# Patient Record
Sex: MALE | Race: WHITE | Employment: FULL TIME | URBAN - METROPOLITAN AREA
[De-identification: names, ages, dates, MRNs, and addresses within clinical notes are randomized per-mention and may not be internally consistent; named-entity substitution may affect disease eponyms.]

---

## 2017-12-11 PROBLEM — E66.9 OBESITY (BMI 30-39.9): Status: ACTIVE | Noted: 2017-12-11

## 2017-12-11 PROBLEM — E78.2 MIXED HYPERLIPIDEMIA: Status: ACTIVE | Noted: 2017-12-11

## 2018-08-22 ENCOUNTER — HOSPITAL ENCOUNTER (OUTPATIENT)
Dept: PHYSICAL THERAPY | Age: 38
Discharge: HOME | End: 2018-08-22
Attending: FAMILY MEDICINE
Payer: COMMERCIAL

## 2018-08-22 DIAGNOSIS — M62.830 MUSCLE SPASM OF BACK: ICD-10-CM

## 2018-08-22 DIAGNOSIS — R20.0 NUMBNESS AND TINGLING OF BOTH LOWER EXTREMITIES: ICD-10-CM

## 2018-08-22 DIAGNOSIS — M54.50 LOW BACK PAIN AT MULTIPLE SITES: ICD-10-CM

## 2018-08-22 DIAGNOSIS — R20.2 NUMBNESS AND TINGLING OF BOTH LOWER EXTREMITIES: ICD-10-CM

## 2018-08-22 PROCEDURE — 97162 PT EVAL MOD COMPLEX 30 MIN: CPT

## 2018-08-22 PROCEDURE — 97110 THERAPEUTIC EXERCISES: CPT

## 2018-08-22 NOTE — THERAPY EVALUATION
Mimi Patrick  : 1980      Payor: Dinora Tran / Plan: [place] [facility] / Product Type: PPO /    [id number] [facility], [address], [facility], [address] [place] [address]  [phone]   [phone]              OUTPATIENT PHYSICAL THERAPY:Initial Assessment 2018    ICD-10: Treatment Diagnosis: Low back pain (M54.5)  Muscle spasm of back (M62.830)  Numbness and tingling of both lower extremities (R20.0, R20.2)                       Precautions/Allergies:   Review of patient's allergies indicates no known allergies. Fall Risk Score: 1 (? 5 = High Risk)  MD Orders: Eval and Treat  MEDICAL/REFERRING DIAGNOSIS:  Low back pain at multiple sites [M54.5]  Muscle spasm of back [M62.830]  Numbness and tingling of both lower extremities [R20.0, R20.2]   DATE OF ONSET: 6/15/18  REFERRING PHYSICIAN: Jewels Alvarezr, MD  RETURN PHYSICIAN APPOINTMENT: TBD by patient      INITIAL ASSESSMENT:   Mr. Melissa Wilson presents to physical therapy with decreased strength, ROM, joint mobility, functional mobility, and chronic pain complaints. These S/S are consistent with Chronic low back pain. Patient will benefit from skilled physical therapy for pain science education, manual therapeutic techniques (as appropriate), therapeutic exercises and activities, neuromuscular re-education, and comprehensive home exercises program to address current impairments and functional limitations. Mimi Patrick will benefit from skilled PT (medically necessary) in order to address above deficits affecting participation in basic ADLs and overall functional tolerance. PROBLEM LIST (Impacting functional limitations):  1. Decreased Strength  2. Decreased ADL/Functional Activities  3. Increased Pain  4. Decreased Activity Tolerance  5. Decreased Flexibility/Joint Mobility  6. Decreased [place] with Home Exercise Program INTERVENTIONS PLANNED:  1. Pain Science Education  2.  Home Exercise Program (HEP)  3. Manual Therapy  4. Neuromuscular Re-education/Strengthening  5. Range of Motion (ROM)  6. Therapeutic Activites  7. Therapeutic Exercise/Strengthening     TREATMENT PLAN:  Effective Dates: 8/22/18 TO 9/22/2018 (30 days). Frequency/Duration:1 x per week for 30 Days  GOALS: (Goals have been discussed and agreed upon with patient.)  SHORT-TERM FUNCTIONAL GOALS: Time Frame: 3 weeks  1. Durenda Blaine will report <=5/10 pain with prolonged postures. 2. Durenda Coats will demonstrate improvement in active lumbar flexion to WNL to show increased  participation in ADLs. 3. Durenda Coats will demonstrate demonstrate improvement in the ability to lift 20 lbs from the floor without pain complaints. 4.  Durenda Coats will show a greater than 8 point decrease on the Modified Oswestry in order to show an increase in lumbar function. 5. Durenda Blaine will be independent in all HEP and will have good understanding of pain management principles. DISCHARGE GOALS: Time Frame: 6 weeks    1. Durenda Coats will show full ROM of the lumbar spine  in order to return to full functional mobility   2. Durenda Coats will show a greater than 15 point decrease on the Oswestry in order to show an increase in spinal  function  3. Durenda Coats will report  Being able to return to independent exercise program at a gym without fear of injury. 4. Durenda Coats will be independent in all advanced HEP     Rehabilitation Potential For Stated Goals: Good  Regarding July Brine therapy, I certify that the treatment plan above will be carried out by a therapist or under their direction. Thank you for this referral,  RT Keegan     Referring Physician Signature: Carmen Rivera MD              Date                    HISTORY:   History of Present Injury/Illness (Reason for Referral):  Patient reports that he first experienced back pain approximately 14 years ago while bowling.  He had severe pain for 2-3 weeks. As a result of the injury he gave up sports because he was afraid of another injury. Since that time he has had intermittent bouts of pain without any significant cause. He states that he has been seeing a chiropractor aproximately once a month for 20 years. He also has been seen by pain management for injections and physical therapy with only temporary relief of his symptoms. Most recent exacerbation started 2.5 months ago, no new injury, just a gradual increase in low back pain and bilateral leg numbness. Patient states that he is afraid to do any strenuous activity, he even is afraid to walk his dogs because they could pull suddenly and re-injury his back. Has recently tried steroids and muscle relaxants with no change in his symptoms.    -Present symptoms/complaints (on day of evaluation) Pain is constant in nature  Pain Scale:  · Current: 7/10  · Best: 3/10  · Worst: 8/10    · Aggravating factors: any prolonged posture, lifting , pushing, pulling    · Relieving factors: nothing    Dominant Side  right  Past Medical History/Comorbidities:   Mr. Lalito Hurd  has a past medical history of Anxiety and H/O renal calculi (2002). Mr. Lalito Hurd  has no past surgical history on file. 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 Prior Level of Function/Work/Activity:  Walk 45 min with dogs in woods. Current Medications:    Current Outpatient Prescriptions:     tamsulosin (FLOMAX) 0.4 mg capsule, , Disp: , Rfl:     diclofenac EC (VOLTAREN) 75 mg EC tablet, Take 1 Tab by mouth two (2) times a day., Disp: 60 Tab, Rfl: 0    baclofen (LIORESAL) 10 mg tablet, Take 1 Tab by mouth three (3) times daily. , Disp: 45 Tab, Rfl: 0    pravastatin (PRAVACHOL) 10 mg tablet, Take 1 Tab by mouth nightly., Disp: 30 Tab, Rfl: 2     Date Last Reviewed:  8/23/2018   Number of Personal Factors/Comorbidities that affect the Plan of Care: 1-2: MODERATE COMPLEXITY   EXAMINATION:   Observation/Orthostatic Postural Assessment:      Overall postural presentation WNL. Palpation:          Increased tone lumbar paraverterbrals  ROM:    AROM/PROM          Lumbar Spine Eval Date: 8/22/18  Re-Assess Date:  Re-Assess Date:    ACTIVE ROM (standing) RIGHT LEFT RIGHT LEFT RIGHT LEFT   flexion 20% limitation        Extension 20% limitation end range pain        sidebending WNL  WNL                Hip flexion WNL WNL       IR WNL WNL       ER WNL WNL                                                               Strength:    Joint: Eval Date: 8/22/18  Re-Assess Date:  Re-Assess Date:     RIGHT LEFT RIGHT LEFT RIGHT LEFT   HIP flex 4/5 4/5       abduction 4/5 4/5       extension 4/5 4/5       Knee extension  5/5 5/5       Knee  Flexion  5/5 5/5                Ankle dorsiflexion 5/5 5/5                 plantarflexion 5/5 5/5                  Joint Mobility Eval Date:  Re-Assess Date:  Re-Assess Date:     RIGHT LEFT RIGHT LEFT RIGHT LEFT   lumbar Mild hypomobility extension with  PAs but non painful            Special Tests:    Slump test: negative, SLR negative for reproduction of sciatica. Positive for hamstring tightness bilateral 45 degrees. Neurological Screen: Assessed @ Initial Visit    Radiating symptoms? Yes , patient complains of whole leg parathesias bilateral  Functional Mobility:  Assessed @ Initial Visit   Balance:  Assessed @ Initial Visit   Single leg stance bilateral 30 sec +  Functional Mobility: Patient able to perform full squat, shoulder mobility WNL, lumbar flexion 8 inches from floor. Body Structures Involved:  1.   2. Joints  3. Muscles  4. Body Functions Affected:  1. Sensory/Pain  2. Neuromusculoskeletal  3. Movement Related Activities and Participation Affected:  1. Mobility  2.  Self Care   Number of elements that affect the Plan of Care: 3: MODERATE COMPLEXITY   CLINICAL PRESENTATION:   Presentation: Evolving clinical presentation with changing clinical characteristics: MODERATE COMPLEXITY   CLINICAL DECISION MAKING:   Outcome Measure: Tool Used: Disabilities of the Arm, Shoulder and Hand (DASH) Questionnaire - Quick Version  Score:  Initial: 26/55  Most Recent: X/55 (Date: -- )   Interpretation of Score: The DASH is designed to measure the activities of daily living in person's with upper extremity dysfunction or pain. Each section is scored on a 1-5 scale, 5 representing the greatest disability. The scores of each section are added together for a total score of 55. Score 11 12-19 20-28 29-37 38-45 46-54 55   Modifier CH CI CJ CK CL CM CN     ? Carrying, Moving, and Handling Objects:     - CURRENT STATUS: CJ - 20%-39% impaired, limited or restricted    - GOAL STATUS: CJ - 20%-39% impaired, limited or restricted    - D/C STATUS:  CI - 1%-19% impaired, limited or restricted    Medical Necessity:   · Skilled intervention continues to be required due to deficits and impairments seen upon initial evaluation affecting patient's participation in ADLs and functional tasks. ·   Reason for Services/Other Comments:  · Patient continues to require skilled intervention due to deficits and impairments seen upon initial evaluation affecting patient's participation in ADLs and functional tasks. Use of outcome tool(s) and clinical judgement create a POC that gives a: Questionable prediction of patient's progress: MODERATE COMPLEXITY   TREATMENT:   (In addition to Assessment/Re-Assessment sessions the following treatments were rendered)    · Pre-Treatment Pain/ Symptoms: See above report in Initial Assessment 7  7/10       THERAPEUTIC EXERCISE: (15 minutes):  Exercises per grid below to improve mobility, strength and balance. Required minimal visual and verbal cues to promote proper body alignment, promote proper body posture and promote proper body mechanics.   Progressed resistance, range and repetitions as indicated. Date:  8/22/18 Date:   Date:     Activity/Exercise Parameters Parameters Parameters   Patient education Pain science education, POC ex rational                                             Manual Therapy Interventions: (0 minutes): . Treatment/Session Assessment: Patient verbalized and demonstrated understanding of HEP. · Post-Treatment Pain/ Symptoms: 6  Pain= 6/10 ·   Compliance with Program/Exercises: Will assess future therapy sessions. · Recommendations/Intent for next treatment session: \"Next visit will focus on advancements to more challenging activities\".     Future Appointments  Date Time Provider Oneyda Peter   8/29/2018 7:30 AM Eulalio Gallardo MD [facility]   8/29/2018 3:30 PM Sam Rainey, RT [facility]   9/5/2018 3:30 PM Minh Rainey, RT [facility]   9/12/2018 3:30 PM Minh Rainey, RT [facility]   9/19/2018 3:30 PM Minh Rainey, RT [facility]   9/26/2018 3:30 PM Minh Rainey, RT [facility]       Total Treatment Duration:   In 14:10, Out 16;00 total treatment 50 min, 15 min therapeutic exercise    Bibiana Rainey, RT            remember to save as eval

## 2018-08-22 NOTE — PROGRESS NOTES
Ambulatory/Rehab Services H2 Model Falls Risk Assessment    Risk Factor Pts. ·   Confusion/Disorientation/Impulsivity  []    4 ·   Symptomatic Depression  []   2 ·   Altered Elimination  []   1 ·   Dizziness/Vertigo  []   1 ·   Gender (Male)  [x]   1 ·   Any administered antiepileptics (anticonvulsants):  []   2 ·   Any administered benzodiazepines:  []   1 ·   Visual Impairment (specify):  []   1 ·   Portable Oxygen Use  []   1 ·   Orthostatic ? BP  []   1 ·   History of Recent Falls (within 3 mos.)  []   5     Ability to Rise from Chair (choose one) Pts. ·   Ability to rise in a single movement  [x]   0 ·   Pushes up, successful in one attempt  []   1 ·   Multiple attempts, but successful  []   3 ·   Unable to rise without assistance  []   4   Total: (5 or greater = High Risk) 1       Falls Prevention Plan:   []                Physical Limitations to Exercise (specify):   []                Mobility Assistance Device (type):   []                Exercise/Equipment Adaptation (specify):    ©2010 [facility] [address] Patent #[id number].  Federal Law prohibits the replication, distribution or use without written permission from [facility] [employer]

## 2018-08-29 ENCOUNTER — HOSPITAL ENCOUNTER (OUTPATIENT)
Dept: PHYSICAL THERAPY | Age: 38
Discharge: HOME | End: 2018-08-29
Attending: FAMILY MEDICINE
Payer: COMMERCIAL

## 2018-08-29 PROCEDURE — 97110 THERAPEUTIC EXERCISES: CPT

## 2018-08-29 NOTE — PROGRESS NOTES
Chinedu Lei : 1980 Payor: Rey Acuña / Plan: [place] [facility] / Product Type: PPO /  
 [address] at [facility] [address] [facility], [address] [facility], [facility], [address] [place] [address] [phone]   [phone] OUTPATIENT PHYSICAL THERAPY:Daily Note 2018 ICD-10: Treatment Diagnosis: Low back pain (M54.5) Muscle spasm of back (M62.830) Numbness and tingling of both lower extremities (R20.0, R20.2) Precautions/Allergies:  
Review of patient's allergies indicates no known allergies. Fall Risk Score: 1 (? 5 = High Risk) MD Orders: Eval and Treat  MEDICAL/REFERRING DIAGNOSIS: 
Low back pain at multiple sites DATE OF ONSET: 6/15/18 REFERRING PHYSICIAN: Maria Guadalupe Manzano MD 
RETURN PHYSICIAN APPOINTMENT: TBD by patient INITIAL ASSESSMENT:   Mr. Jude Jose presents to physical therapy with decreased strength, ROM, joint mobility, functional mobility, and chronic pain complaints. These S/S are consistent with Chronic low back pain. Patient will benefit from skilled physical therapy for pain science education, manual therapeutic techniques (as appropriate), therapeutic exercises and activities, neuromuscular re-education, and comprehensive home exercises program to address current impairments and functional limitations. Chinedu Lei will benefit from skilled PT (medically necessary) in order to address above deficits affecting participation in basic ADLs and overall functional tolerance. PROBLEM LIST (Impacting functional limitations): 1. Decreased Strength 2. Decreased ADL/Functional Activities 3. Increased Pain 4. Decreased Activity Tolerance 5. Decreased Flexibility/Joint Mobility 6. Decreased [place] with Home Exercise Program INTERVENTIONS PLANNED: 
1. Pain Science Education 2. Home Exercise Program (HEP) 3. Manual Therapy 4. Neuromuscular Re-education/Strengthening 5. Range of Motion (ROM) 6. Therapeutic Activites 7. Therapeutic Exercise/Strengthening TREATMENT PLAN: 
Effective Dates: 8/22/18 TO 9/22/2018 (30 days). Frequency/Duration:1 x per week for 30 Days GOALS: (Goals have been discussed and agreed upon with patient.) SHORT-TERM FUNCTIONAL GOALS: Time Frame: 3 weeks 1. Tee Courser will report <=5/10 pain with prolonged postures. 2. Tee Courser will demonstrate improvement in active lumbar flexion to WNL to show increased  participation in ADLs. 3. Tee Courser will demonstrate demonstrate improvement in the ability to lift 20 lbs from the floor without pain complaints. 4.  Tee Courser will show a greater than 8 point decrease on the Modified Oswestry in order to show an increase in lumbar function. 5. Tee Courser will be independent in all HEP and will have good understanding of pain management principles. DISCHARGE GOALS: Time Frame: 6 weeks 1. Tee Courser will show full ROM of the lumbar spine  in order to return to full functional mobility 2. Tee Courser will show a greater than 15 point decrease on the Oswestry in order to show an increase in spinal  function 3. Tee Courser will report  Being able to return to independent exercise program at a gym without fear of injury. 4. Tee Courser will be independent in all advanced HEP Rehabilitation Potential For Stated Goals: Good Regarding Marsha Ochoa therapy, I certify that the treatment plan above will be carried out by a therapist or under their direction. Thank you for this referral, 
Shannan Rainey,  Referring Physician Signature: Giselle Hawk MD              Date HISTORY:  
History of Present Injury/Illness (Reason for Referral): 
Patient reports that he first experienced back pain approximately 14 years ago while bowling. He had severe pain for 2-3 weeks. As a result of the injury he gave up sports because he was afraid of another injury.  Since that time he has had intermittent bouts of pain without any significant cause. He states that he has been seeing a chiropractor aproximately once a month for 20 years. He also has been seen by pain management for injections and physical therapy with only temporary relief of his symptoms. Most recent exacerbation started 2.5 months ago, no new injury, just a gradual increase in low back pain and bilateral leg numbness. Patient states that he is afraid to do any strenuous activity, he even is afraid to walk his dogs because they could pull suddenly and re-injury his back. Has recently tried steroids and muscle relaxants with no change in his symptoms. 
 
-Present symptoms/complaints (on day of evaluation) Pain is constant in nature Pain Scale: · Current: 7/10 · Best: 3/10 · Worst: 8/10 · Aggravating factors: any prolonged posture, lifting , pushing, pulling · Relieving factors: nothing Dominant Side 
right Past Medical History/Comorbidities:  
Mr. Barbara Duran  has a past medical history of Anxiety and H/O renal calculi (2002). Mr. Barbara Duran  has no past surgical history on file. Social History/Living Environment:  
  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 Prior Level of Function/Work/Activity: 
Walk 45 min with dogs in woods. Current Medications:   
Current Outpatient Prescriptions:  
  tamsulosin (FLOMAX) 0.4 mg capsule, , Disp: , Rfl:  
  diclofenac EC (VOLTAREN) 75 mg EC tablet, Take 1 Tab by mouth two (2) times a day., Disp: 60 Tab, Rfl: 0 
  baclofen (LIORESAL) 10 mg tablet, Take 1 Tab by mouth three (3) times daily. , Disp: 45 Tab, Rfl: 0 
  pravastatin (PRAVACHOL) 10 mg tablet, Take 1 Tab by mouth nightly., Disp: 30 Tab, Rfl: 2 Date Last Reviewed:  8/30/2018 Number of Personal Factors/Comorbidities that affect the Plan of Care: 1-2: MODERATE COMPLEXITY EXAMINATION:  
 Observation/Orthostatic Postural Assessment:   
  Overall postural presentation WNL. Palpation:   
      Increased tone lumbar paraverterbrals ROM:   
AROM/PROM Lumbar Spine Eval Date: 8/22/18  Re-Assess Date:  Re-Assess Date:   
ACTIVE ROM (standing) RIGHT LEFT RIGHT LEFT RIGHT LEFT  
flexion 20% limitation Extension 20% limitation end range pain       
sidebending WNL  WNL Hip flexion WNL WNL IR WNL WNL      
ER WNL WNL Strength:   
Joint: Eval Date: 8/22/18  Re-Assess Date:  Re-Assess Date:   
 RIGHT LEFT RIGHT LEFT RIGHT LEFT  
HIP flex 4/5 4/5      
abduction 4/5 4/5      
extension 4/5 4/5 Knee extension  5/5 5/5 Knee  Flexion  5/5 5/5 Ankle dorsiflexion 5/5 5/5      
          plantarflexion 5/5 5/5 Joint Mobility Eval Date:  Re-Assess Date:  Re-Assess Date:   
 RIGHT LEFT RIGHT LEFT RIGHT LEFT  
lumbar Mild hypomobility extension with  PAs but non painful Special Tests:   
Slump test: negative, SLR negative for reproduction of sciatica. Positive for hamstring tightness bilateral 45 degrees. Neurological Screen: Assessed @ Initial Visit Radiating symptoms? Yes , patient complains of whole leg parathesias bilateral 
Functional Mobility:  Assessed @ Initial Visit Balance:  Assessed @ Initial Visit Single leg stance bilateral 30 sec + Functional Mobility: Patient able to perform full squat, shoulder mobility WNL, lumbar flexion 8 inches from floor. Body Structures Involved: 
1.  
2. Joints 3. Muscles 4. Body Functions Affected: 1. Sensory/Pain 2. Neuromusculoskeletal 
3. Movement Related Activities and Participation Affected: 1. Mobility 2. Self Care Number of elements that affect the Plan of Care: 3: MODERATE COMPLEXITY CLINICAL PRESENTATION:  
Presentation: Evolving clinical presentation with changing clinical characteristics: MODERATE COMPLEXITY CLINICAL DECISION MAKING:  
Outcome Measure: Tool Used: Disabilities of the Arm, Shoulder and Hand (DASH) Questionnaire - Quick Version Score:  Initial: 26/55  Most Recent: X/55 (Date: -- ) Interpretation of Score: The DASH is designed to measure the activities of daily living in person's with upper extremity dysfunction or pain. Each section is scored on a 1-5 scale, 5 representing the greatest disability. The scores of each section are added together for a total score of 55. Score 11 12-19 20-28 29-37 38-45 46-54 55 Modifier CH CI CJ CK CL CM CN ? Carrying, Moving, and Handling Objects:  
  - CURRENT STATUS: CJ - 20%-39% impaired, limited or restricted  - GOAL STATUS: CJ - 20%-39% impaired, limited or restricted  - D/C STATUS:  CI - 1%-19% impaired, limited or restricted Medical Necessity:  
· Skilled intervention continues to be required due to deficits and impairments seen upon initial evaluation affecting patient's participation in ADLs and functional tasks. ·  
Reason for Services/Other Comments: 
· Patient continues to require skilled intervention due to deficits and impairments seen upon initial evaluation affecting patient's participation in ADLs and functional tasks. Use of outcome tool(s) and clinical judgement create a POC that gives a: Questionable prediction of patient's progress: MODERATE COMPLEXITY  
TREATMENT:  
(In addition to Assessment/Re-Assessment sessions the following treatments were rendered) · Pre-Treatment Pain/ Symptoms:  
5/10 Walking dogs 45 min daily, was able to stain his deck was tired but was able to perform them. Hasn't bought pain science book yet or gone on website to begin learning tasks yet. THERAPEUTIC EXERCISE: (30 minutes):  Exercises per grid below to improve mobility, strength and balance.   Required minimal visual and verbal cues to promote proper body alignment, promote proper body posture and promote proper body mechanics. Progressed resistance, range and repetitions as indicated. Date: 
8/22/18 Date: 
8/30/18 Date: Activity/Exercise Parameters Parameters Parameters Patient education Pain science education, POC ex rational  Pain science basic principles ex rational   
Hip hinge  3 x 10 Bridge   3 x 10    
plank Cat camel  10 x Clam shells  2 x 10 bilateral   
     
 
 Manual Therapy Interventions: (0 minutes): . Treatment/Session Assessment: Patient verbalized and demonstrated understanding of HEP. · Post-Treatment Pain/ Symptoms: 6 Pain= 6/10 · Compliance with Program/Exercises: Will assess future therapy sessions. · Recommendations/Intent for next treatment session: \"Next visit will focus on advancements to more challenging activities\". Future Appointments Date Time Provider Oneyda Peter 9/5/2018 4:15 PM Bibiana Rainey, RT [facility]  
9/12/2018 4:15 PM Desire Rainey, RT [facility]  
9/19/2018 4:15 PM Desire Rainey RT [facility]  
9/26/2018 4:15 PM Desire Rainey, RT [facility] Total Treatment Duration: 
30 min T-ex Bibiana Rainey, RT  
    
 
 
remember to save as eval

## 2018-09-05 ENCOUNTER — HOSPITAL ENCOUNTER (OUTPATIENT)
Dept: PHYSICAL THERAPY | Age: 38
Discharge: HOME | End: 2018-09-05
Attending: FAMILY MEDICINE
Payer: COMMERCIAL

## 2018-09-05 PROCEDURE — 97110 THERAPEUTIC EXERCISES: CPT

## 2018-09-05 NOTE — PROGRESS NOTES
Eitan Aquino : 1980 Payor: Brice Kumar / Plan: [facility] / Product Type: PPO /  
 [address] at [facility] [address] [facility] [address], [facility], [address] [facility] [phone]   [phone] OUTPATIENT PHYSICAL THERAPY:Daily Note 2018 ICD-10: Treatment Diagnosis: Low back pain (M54.5) Muscle spasm of back (M62.830) Numbness and tingling of both lower extremities (R20.0, R20.2) Precautions/Allergies:  
Review of patient's allergies indicates no known allergies. Fall Risk Score: 1 (? 5 = High Risk) MD Orders: Eval and Treat  MEDICAL/REFERRING DIAGNOSIS: 
Low back pain at multiple sites DATE OF ONSET: 6/15/18 REFERRING PHYSICIAN: Fausto mAin MD 
RETURN PHYSICIAN APPOINTMENT: TBD by patient INITIAL ASSESSMENT:   Mr. Lalito Hurd presents to physical therapy with decreased strength, ROM, joint mobility, functional mobility, and chronic pain complaints. These S/S are consistent with Chronic low back pain. Patient will benefit from skilled physical therapy for pain science education, manual therapeutic techniques (as appropriate), therapeutic exercises and activities, neuromuscular re-education, and comprehensive home exercises program to address current impairments and functional limitations. Eitan Aquino will benefit from skilled PT (medically necessary) in order to address above deficits affecting participation in basic ADLs and overall functional tolerance. PROBLEM LIST (Impacting functional limitations): 1. Decreased Strength 2. Decreased ADL/Functional Activities 3. Increased Pain 4. Decreased Activity Tolerance 5. Decreased Flexibility/Joint Mobility 6. Decreased [place] with Home Exercise Program INTERVENTIONS PLANNED: 
1. Pain Science Education 2. Home Exercise Program (HEP) 3. Manual Therapy 4. Neuromuscular Re-education/Strengthening 5. Range of Motion (ROM) 6. Therapeutic Activites 7. Therapeutic Exercise/Strengthening TREATMENT PLAN: 
Effective Dates: 8/22/18 TO 9/22/2018 (30 days). Frequency/Duration:1 x per week for 30 Days GOALS: (Goals have been discussed and agreed upon with patient.) SHORT-TERM FUNCTIONAL GOALS: Time Frame: 3 weeks 1. Patience Houser will report <=5/10 pain with prolonged postures. 2. Patience Houser will demonstrate improvement in active lumbar flexion to WNL to show increased  participation in ADLs. 3. Patience Houser will demonstrate demonstrate improvement in the ability to lift 20 lbs from the floor without pain complaints. 4.  Patience Houser will show a greater than 8 point decrease on the Modified Oswestry in order to show an increase in lumbar function. 5. Patience Houser will be independent in all HEP and will have good understanding of pain management principles. DISCHARGE GOALS: Time Frame: 6 weeks 1. Patience Houser will show full ROM of the lumbar spine  in order to return to full functional mobility 2. Patience Houser will show a greater than 15 point decrease on the Oswestry in order to show an increase in spinal  function 3. Patience Houser will report  Being able to return to independent exercise program at a gym without fear of injury. 4. Patience Houser will be independent in all advanced HEP Rehabilitation Potential For Stated Goals: Good Regarding Beata Palmer therapy, I certify that the treatment plan above will be carried out by a therapist or under their direction. Thank you for this referral, 
Xin Rainey, RT Referring Physician Signature: Prabhjot Unger MD              Date HISTORY:  
History of Present Injury/Illness (Reason for Referral): 
Patient reports that he first experienced back pain approximately 14 years ago while bowling. He had severe pain for 2-3 weeks. As a result of the injury he gave up sports because he was afraid of another injury.  Since that time he has had intermittent bouts of pain without any significant cause. He states that he has been seeing a chiropractor aproximately once a month for 20 years. He also has been seen by pain management for injections and physical therapy with only temporary relief of his symptoms. Most recent exacerbation started 2.5 months ago, no new injury, just a gradual increase in low back pain and bilateral leg numbness. Patient states that he is afraid to do any strenuous activity, he even is afraid to walk his dogs because they could pull suddenly and re-injury his back. Has recently tried steroids and muscle relaxants with no change in his symptoms. 
 
-Present symptoms/complaints (on day of evaluation) Pain is constant in nature Pain Scale: · Current: 7/10 · Best: 3/10 · Worst: 8/10 · Aggravating factors: any prolonged posture, lifting , pushing, pulling · Relieving factors: nothing Dominant Side 
right Past Medical History/Comorbidities:  
Mr. Micah Hutchinson  has a past medical history of Anxiety and H/O renal calculi (2002). Mr. Micah Hutchinson  has no past surgical history on file. Social History/Living Environment:  
  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 Prior Level of Function/Work/Activity: 
Walk 45 min with dogs in woods. Current Medications:   
Current Outpatient Prescriptions:  
  tamsulosin (FLOMAX) 0.4 mg capsule, , Disp: , Rfl:  
  diclofenac EC (VOLTAREN) 75 mg EC tablet, Take 1 Tab by mouth two (2) times a day., Disp: 60 Tab, Rfl: 0 
  baclofen (LIORESAL) 10 mg tablet, Take 1 Tab by mouth three (3) times daily. , Disp: 45 Tab, Rfl: 0 
  pravastatin (PRAVACHOL) 10 mg tablet, Take 1 Tab by mouth nightly., Disp: 30 Tab, Rfl: 2 Date Last Reviewed:  9/6/2018 Number of Personal Factors/Comorbidities that affect the Plan of Care: 1-2: MODERATE COMPLEXITY EXAMINATION:  
 Observation/Orthostatic Postural Assessment:   
  Overall postural presentation WNL. Palpation:   
      Increased tone lumbar paraverterbrals ROM:   
AROM/PROM Lumbar Spine Eval Date: 8/22/18  Re-Assess Date:  Re-Assess Date:   
ACTIVE ROM (standing) RIGHT LEFT RIGHT LEFT RIGHT LEFT  
flexion 20% limitation Extension 20% limitation end range pain       
sidebending WNL  WNL Hip flexion WNL WNL IR WNL WNL      
ER WNL WNL Strength:   
Joint: Eval Date: 8/22/18  Re-Assess Date:  Re-Assess Date:   
 RIGHT LEFT RIGHT LEFT RIGHT LEFT  
HIP flex 4/5 4/5      
abduction 4/5 4/5      
extension 4/5 4/5 Knee extension  5/5 5/5 Knee  Flexion  5/5 5/5 Ankle dorsiflexion 5/5 5/5      
          plantarflexion 5/5 5/5 Joint Mobility Eval Date:  Re-Assess Date:  Re-Assess Date:   
 RIGHT LEFT RIGHT LEFT RIGHT LEFT  
lumbar Mild hypomobility extension with  PAs but non painful Special Tests:   
Slump test: negative, SLR negative for reproduction of sciatica. Positive for hamstring tightness bilateral 45 degrees. Neurological Screen: Assessed @ Initial Visit Radiating symptoms? Yes , patient complains of whole leg parathesias bilateral 
Functional Mobility:  Assessed @ Initial Visit Balance:  Assessed @ Initial Visit Single leg stance bilateral 30 sec + Functional Mobility: Patient able to perform full squat, shoulder mobility WNL, lumbar flexion 8 inches from floor. Body Structures Involved: 
1.  
2. Joints 3. Muscles 4. Body Functions Affected: 1. Sensory/Pain 2. Neuromusculoskeletal 
3. Movement Related Activities and Participation Affected: 1. Mobility 2. Self Care Number of elements that affect the Plan of Care: 3: MODERATE COMPLEXITY CLINICAL PRESENTATION:  
Presentation: Evolving clinical presentation with changing clinical characteristics: MODERATE COMPLEXITY CLINICAL DECISION MAKING:  
Outcome Measure: Tool Used: Disabilities of the Arm, Shoulder and Hand (DASH) Questionnaire - Quick Version Score:  Initial: 26/55  Most Recent: X/55 (Date: -- ) Interpretation of Score: The DASH is designed to measure the activities of daily living in person's with upper extremity dysfunction or pain. Each section is scored on a 1-5 scale, 5 representing the greatest disability. The scores of each section are added together for a total score of 55. Score 11 12-19 20-28 29-37 38-45 46-54 55 Modifier CH CI CJ CK CL CM CN ? Carrying, Moving, and Handling Objects:  
  - CURRENT STATUS: CJ - 20%-39% impaired, limited or restricted  - GOAL STATUS: CJ - 20%-39% impaired, limited or restricted  - D/C STATUS:  CI - 1%-19% impaired, limited or restricted Medical Necessity:  
· Skilled intervention continues to be required due to deficits and impairments seen upon initial evaluation affecting patient's participation in ADLs and functional tasks. ·  
Reason for Services/Other Comments: 
· Patient continues to require skilled intervention due to deficits and impairments seen upon initial evaluation affecting patient's participation in ADLs and functional tasks. Use of outcome tool(s) and clinical judgement create a POC that gives a: Questionable prediction of patient's progress: MODERATE COMPLEXITY  
TREATMENT:  
(In addition to Assessment/Re-Assessment sessions the following treatments were rendered) · Pre-Treatment Pain/ Symptoms:  
3/10 Has been doing more work around his house just stiff. THERAPEUTIC EXERCISE: (45 minutes):  Exercises per grid below to improve mobility, strength and balance. Required minimal visual and verbal cues to promote proper body alignment, promote proper body posture and promote proper body mechanics. Progressed resistance, range and repetitions as indicated. Date: 8/22/18 Date: 
8/30/18 Date: 
9/5/18 Activity/Exercise Parameters Parameters Parameters Patient education Pain science education, POC ex rational  Pain science basic principles ex rational Pain science Hip hinge  3 x 10 10 x Bridge   3 x 10  3 x 10  
plank   10 x Cat camel  10 x 10 x Clam shells  2 x 10 bilateral   
Treadmill   10 min , 2.7 mph Active hamstring stretch   Use purple band 10 x ea Dead lift   3 x 10 20 lbs  
pallof press   Red band 20 x ea way Sideways walking   60 feet red band Manual Therapy Interventions: (0 minutes): . Treatment/Session Assessment: Patient verbalized and demonstrated understanding of HEP. · Post-Treatment Pain/ Symptoms: 3 Pain= 3/10 Demonstrated good technique with dead lifting today. No increase in back pain. ·  
Compliance with Program/Exercises: Will assess future therapy sessions. · Recommendations/Intent for next treatment session: \"Next visit will focus on advancements to more challenging activities\". Future Appointments Date Time Provider Oneyda Peter 9/12/2018 4:15 PM Bibiana Rainey RT [facility]  
9/19/2018 4:15 PM Enrique Rainey RT [facility]  
9/26/2018 4:15 PM Enrique Rainey RT [facility] Total Treatment Duration: 
45 min T-ex Bibiana Rainey RT  
    
 
 
remember to save as eval

## 2018-09-12 ENCOUNTER — HOSPITAL ENCOUNTER (OUTPATIENT)
Dept: PHYSICAL THERAPY | Age: 38
Discharge: HOME | End: 2018-09-12
Attending: FAMILY MEDICINE
Payer: COMMERCIAL

## 2018-09-12 PROCEDURE — 97110 THERAPEUTIC EXERCISES: CPT

## 2018-09-12 NOTE — PROGRESS NOTES
Kurt Wolf : 1980 Payor: Suni Lane / Plan: [facility] / Product Type: PPO /  
 [address] [facility] [address] [facility], [address] [facility], [facility], [id number] [place] [address] [phone]   [phone] OUTPATIENT PHYSICAL THERAPY:Daily Note 2018 ICD-10: Treatment Diagnosis: Low back pain (M54.5) Muscle spasm of back (M62.830) Numbness and tingling of both lower extremities (R20.0, R20.2) Precautions/Allergies:  
Review of patient's allergies indicates no known allergies. Fall Risk Score: 1 (? 5 = High Risk) MD Orders: Eval and Treat  MEDICAL/REFERRING DIAGNOSIS: 
Low back pain at multiple sites DATE OF ONSET: 6/15/18 REFERRING PHYSICIAN: Wanda Singh MD 
RETURN PHYSICIAN APPOINTMENT: TBD by patient INITIAL ASSESSMENT:   Mr. Ken Wallis presents to physical therapy with decreased strength, ROM, joint mobility, functional mobility, and chronic pain complaints. These S/S are consistent with Chronic low back pain. Patient will benefit from skilled physical therapy for pain science education, manual therapeutic techniques (as appropriate), therapeutic exercises and activities, neuromuscular re-education, and comprehensive home exercises program to address current impairments and functional limitations. Kurt Wolf will benefit from skilled PT (medically necessary) in order to address above deficits affecting participation in basic ADLs and overall functional tolerance. PROBLEM LIST (Impacting functional limitations): 1. Decreased Strength 2. Decreased ADL/Functional Activities 3. Increased Pain 4. Decreased Activity Tolerance 5. Decreased Flexibility/Joint Mobility 6. Decreased [place] with Home Exercise Program INTERVENTIONS PLANNED: 
1. Pain Science Education 2. Home Exercise Program (HEP) 3. Manual Therapy 4. Neuromuscular Re-education/Strengthening 5. Range of Motion (ROM) 6. Therapeutic Activites 7. Therapeutic Exercise/Strengthening TREATMENT PLAN: 
Effective Dates: 8/22/18 TO 9/22/2018 (30 days). Frequency/Duration:1 x per week for 30 Days GOALS: (Goals have been discussed and agreed upon with patient.) SHORT-TERM FUNCTIONAL GOALS: Time Frame: 3 weeks 1. Eitan Aquino will report <=5/10 pain with prolonged postures. 2. Eitan Aquino will demonstrate improvement in active lumbar flexion to WNL to show increased  participation in ADLs. 3. Eitan Aquino will demonstrate demonstrate improvement in the ability to lift 20 lbs from the floor without pain complaints. 4.  Eitan Aquino will show a greater than 8 point decrease on the Modified Oswestry in order to show an increase in lumbar function. 5. Eitan Aquino will be independent in all HEP and will have good understanding of pain management principles. DISCHARGE GOALS: Time Frame: 6 weeks 1. Eitan Aquino will show full ROM of the lumbar spine  in order to return to full functional mobility 2. Eitan Aquino will show a greater than 15 point decrease on the Oswestry in order to show an increase in spinal  function 3. Eitan Aquino will report  Being able to return to independent exercise program at a gym without fear of injury. 4. Eitan Aquino will be independent in all advanced HEP Rehabilitation Potential For Stated Goals: Good Regarding Yue Mosley therapy, I certify that the treatment plan above will be carried out by a therapist or under their direction. Thank you for this referral, 
Donato Kocher Papenfuss,  Referring Physician Signature: Fausto Amin MD              Date HISTORY:  
History of Present Injury/Illness (Reason for Referral): 
Patient reports that he first experienced back pain approximately 14 years ago while bowling. He had severe pain for 2-3 weeks. As a result of the injury he gave up sports because he was afraid of another injury.  Since that time he has had intermittent bouts of pain without any significant cause. He states that he has been seeing a chiropractor aproximately once a month for 20 years. He also has been seen by pain management for injections and physical therapy with only temporary relief of his symptoms. Most recent exacerbation started 2.5 months ago, no new injury, just a gradual increase in low back pain and bilateral leg numbness. Patient states that he is afraid to do any strenuous activity, he even is afraid to walk his dogs because they could pull suddenly and re-injury his back. Has recently tried steroids and muscle relaxants with no change in his symptoms. 
 
-Present symptoms/complaints (on day of evaluation) Pain is constant in nature Pain Scale: · Current: 7/10 · Best: 3/10 · Worst: 8/10 · Aggravating factors: any prolonged posture, lifting , pushing, pulling · Relieving factors: nothing Dominant Side 
right Past Medical History/Comorbidities:  
Mr. Jj Hernandez  has a past medical history of Anxiety and H/O renal calculi (2002). Mr. Jj Hernandez  has no past surgical history on file. Social History/Living Environment:  
  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 Prior Level of Function/Work/Activity: 
Walk 45 min with dogs in woods. Current Medications:   
Current Outpatient Prescriptions:  
  tamsulosin (FLOMAX) 0.4 mg capsule, , Disp: , Rfl:  
  diclofenac EC (VOLTAREN) 75 mg EC tablet, Take 1 Tab by mouth two (2) times a day., Disp: 60 Tab, Rfl: 0 
  baclofen (LIORESAL) 10 mg tablet, Take 1 Tab by mouth three (3) times daily. , Disp: 45 Tab, Rfl: 0 
  pravastatin (PRAVACHOL) 10 mg tablet, Take 1 Tab by mouth nightly., Disp: 30 Tab, Rfl: 2 Date Last Reviewed:  9/13/2018 Number of Personal Factors/Comorbidities that affect the Plan of Care: 1-2: MODERATE COMPLEXITY EXAMINATION:  
 Observation/Orthostatic Postural Assessment:   
  Overall postural presentation WNL. Palpation:   
      Increased tone lumbar paraverterbrals ROM:   
AROM/PROM Lumbar Spine Eval Date: 8/22/18  Re-Assess Date:  Re-Assess Date:   
ACTIVE ROM (standing) RIGHT LEFT RIGHT LEFT RIGHT LEFT  
flexion 20% limitation Extension 20% limitation end range pain       
sidebending WNL  WNL Hip flexion WNL WNL IR WNL WNL      
ER WNL WNL Strength:   
Joint: Eval Date: 8/22/18  Re-Assess Date:  Re-Assess Date:   
 RIGHT LEFT RIGHT LEFT RIGHT LEFT  
HIP flex 4/5 4/5      
abduction 4/5 4/5      
extension 4/5 4/5 Knee extension  5/5 5/5 Knee  Flexion  5/5 5/5 Ankle dorsiflexion 5/5 5/5      
          plantarflexion 5/5 5/5 Joint Mobility Eval Date:  Re-Assess Date:  Re-Assess Date:   
 RIGHT LEFT RIGHT LEFT RIGHT LEFT  
lumbar Mild hypomobility extension with  PAs but non painful Special Tests:   
Slump test: negative, SLR negative for reproduction of sciatica. Positive for hamstring tightness bilateral 45 degrees. Neurological Screen: Assessed @ Initial Visit Radiating symptoms? Yes , patient complains of whole leg parathesias bilateral 
Functional Mobility:  Assessed @ Initial Visit Balance:  Assessed @ Initial Visit Single leg stance bilateral 30 sec + Functional Mobility: Patient able to perform full squat, shoulder mobility WNL, lumbar flexion 8 inches from floor. Body Structures Involved: 
1.  
2. Joints 3. Muscles 4. Body Functions Affected: 1. Sensory/Pain 2. Neuromusculoskeletal 
3. Movement Related Activities and Participation Affected: 1. Mobility 2. Self Care Number of elements that affect the Plan of Care: 3: MODERATE COMPLEXITY CLINICAL PRESENTATION:  
Presentation: Evolving clinical presentation with changing clinical characteristics: MODERATE COMPLEXITY CLINICAL DECISION MAKING:  
Outcome Measure: Tool Used: Disabilities of the Arm, Shoulder and Hand (DASH) Questionnaire - Quick Version Score:  Initial: 26/55  Most Recent: X/55 (Date: -- ) Interpretation of Score: The DASH is designed to measure the activities of daily living in person's with upper extremity dysfunction or pain. Each section is scored on a 1-5 scale, 5 representing the greatest disability. The scores of each section are added together for a total score of 55. Score 11 12-19 20-28 29-37 38-45 46-54 55 Modifier CH CI CJ CK CL CM CN ? Carrying, Moving, and Handling Objects:  
  - CURRENT STATUS: CJ - 20%-39% impaired, limited or restricted  - GOAL STATUS: CJ - 20%-39% impaired, limited or restricted  - D/C STATUS:  CI - 1%-19% impaired, limited or restricted Medical Necessity:  
· Skilled intervention continues to be required due to deficits and impairments seen upon initial evaluation affecting patient's participation in ADLs and functional tasks. ·  
Reason for Services/Other Comments: 
· Patient continues to require skilled intervention due to deficits and impairments seen upon initial evaluation affecting patient's participation in ADLs and functional tasks. Use of outcome tool(s) and clinical judgement create a POC that gives a: Questionable prediction of patient's progress: MODERATE COMPLEXITY  
TREATMENT:  
(In addition to Assessment/Re-Assessment sessions the following treatments were rendered) · Pre-Treatment Pain/ Symptoms:  
3/10 Has been doing more work around his house just stiff. THERAPEUTIC EXERCISE: (45 minutes):  Exercises per grid below to improve mobility, strength and balance. Required minimal visual and verbal cues to promote proper body alignment, promote proper body posture and promote proper body mechanics. Progressed resistance, range and repetitions as indicated. Date: 8/22/18 Date: 
8/30/18 Date: 
9/5/18 Date 9/12/18 Activity/Exercise Parameters Parameters Parameters parameters Patient education Pain science education, POC ex rational  Pain science basic principles ex rational Pain science Pain science priciples Hip hinge  3 x 10 10 x 10 x Bridge   3 x 10  3 x 10 3 x 10   
plank   10 x 10 x Cat camel  10 x 10 x 10x Clam shells  2 x 10 bilateral    
Treadmill   10 min , 2.7 mph 10 min 3 Active hamstring stretch   Use purple band 10 x ea 10 x ea Dead lift   3 x 10 20 lbs 3 x 10 40 lb bandblack   
pallof press   Red band 20 x ea way Black band Sideways walking   60 feet red band 80 ft  
planks    1 min x 2 Manual Therapy Interventions: (0 minutes): . Treatment/Session Assessment: Patient verbalized and demonstrated understanding of HEP. · Post-Treatment Pain/ Symptoms: 3 Pain= 3/10 Demonstrated good technique with dead lifting today. No increase in back pain. Very good tech plank. Despite good strength and ROM patient continues to complain of pain. ·  
Compliance with Program/Exercises: Patient has been walking more and doing some strengthening ex at home. Has not yet looked into reading material that was suggested. Patient having a some what difficult time accepting Pain Science principles. Has agreed that he will read the provided material over the next week. · Recommendations/Intent for next treatment session: \"Next visit will focus on advancements to more challenging activities\". Future Appointments Date Time Provider Oneyda Peter 9/19/2018 4:15 PM RT [facility]  
9/26/2018 4:15 PM RT Dave [facility] Total Treatment Duration: 
45 min T-ex Time in 17:00, out 17:50 Clayton Rainey, PT

## 2018-09-19 ENCOUNTER — HOSPITAL ENCOUNTER (OUTPATIENT)
Dept: PHYSICAL THERAPY | Age: 38
Discharge: HOME | End: 2018-09-19
Attending: FAMILY MEDICINE
Payer: COMMERCIAL

## 2018-09-19 PROCEDURE — 97110 THERAPEUTIC EXERCISES: CPT

## 2018-09-19 NOTE — PROGRESS NOTES
Maggie Ding : 1980 Payor: Talia Winn / Plan: [facility] / Product Type: PPO /  
 [address] [facility] [address] 38[address] [facility], [address] [place] [address], [address] [place] [address] [phone]   [phone] OUTPATIENT PHYSICAL THERAPY:Daily Note 2018 ICD-10: Treatment Diagnosis: Low back pain (M54.5) Muscle spasm of back (M62.830) Numbness and tingling of both lower extremities (R20.0, R20.2) Precautions/Allergies:  
Review of patient's allergies indicates no known allergies. Fall Risk Score: 1 (? 5 = High Risk) MD Orders: Eval and Treat  MEDICAL/REFERRING DIAGNOSIS: 
Low back pain at multiple sites DATE OF ONSET: 6/15/18 REFERRING PHYSICIAN: Meagan Pereyra MD 
RETURN PHYSICIAN APPOINTMENT: TBD by patient INITIAL ASSESSMENT:   Mr. Atif Crews presents to physical therapy with decreased strength, ROM, joint mobility, functional mobility, and chronic pain complaints. These S/S are consistent with Chronic low back pain. Patient will benefit from skilled physical therapy for pain science education, manual therapeutic techniques (as appropriate), therapeutic exercises and activities, neuromuscular re-education, and comprehensive home exercises program to address current impairments and functional limitations. Maggie Ding will benefit from skilled PT (medically necessary) in order to address above deficits affecting participation in basic ADLs and overall functional tolerance. PROBLEM LIST (Impacting functional limitations): 1. Decreased Strength 2. Decreased ADL/Functional Activities 3. Increased Pain 4. Decreased Activity Tolerance 5. Decreased Flexibility/Joint Mobility 6. Decreased [place] with Home Exercise Program INTERVENTIONS PLANNED: 
1. Pain Science Education 2. Home Exercise Program (HEP) 3. Manual Therapy 4. Neuromuscular Re-education/Strengthening 5. Range of Motion (ROM) 6. Therapeutic Activites 7. Therapeutic Exercise/Strengthening TREATMENT PLAN: 
Effective Dates: 8/22/18 TO 9/22/2018 (30 days). Frequency/Duration:1 x per week for 30 Days GOALS: (Goals have been discussed and agreed upon with patient.) SHORT-TERM FUNCTIONAL GOALS: Time Frame: 3 weeks 1. Eitan Aquino will report <=5/10 pain with prolonged postures. 2. Eitan Aquino will demonstrate improvement in active lumbar flexion to WNL to show increased  participation in ADLs. 3. Eitan Aquino will demonstrate demonstrate improvement in the ability to lift 20 lbs from the floor without pain complaints. 4.  Eitan Aquino will show a greater than 8 point decrease on the Modified Oswestry in order to show an increase in lumbar function. 5. Eitan Aquino will be independent in all HEP and will have good understanding of pain management principles. DISCHARGE GOALS: Time Frame: 6 weeks 1. Eitan Aquino will show full ROM of the lumbar spine  in order to return to full functional mobility 2. Eitan Aquino will show a greater than 15 point decrease on the Oswestry in order to show an increase in spinal  function 3. Eitan Aquino will report  Being able to return to independent exercise program at a gym without fear of injury. 4. Eitan Aquino will be independent in all advanced HEP Rehabilitation Potential For Stated Goals: Good Regarding Yue Mosley therapy, I certify that the treatment plan above will be carried out by a therapist or under their direction. Thank you for this referral, 
Donato Kocher Papenfuss,  Referring Physician Signature: Fausto Amin MD              Date HISTORY:  
History of Present Injury/Illness (Reason for Referral): 
Patient reports that he first experienced back pain approximately 14 years ago while bowling. He had severe pain for 2-3 weeks. As a result of the injury he gave up sports because he was afraid of another injury.  Since that time he has had intermittent bouts of pain without any significant cause. He states that he has been seeing a chiropractor aproximately once a month for 20 years. He also has been seen by pain management for injections and physical therapy with only temporary relief of his symptoms. Most recent exacerbation started 2.5 months ago, no new injury, just a gradual increase in low back pain and bilateral leg numbness. Patient states that he is afraid to do any strenuous activity, he even is afraid to walk his dogs because they could pull suddenly and re-injury his back. Has recently tried steroids and muscle relaxants with no change in his symptoms. 
 
-Present symptoms/complaints (on day of evaluation) Pain is constant in nature Pain Scale: · Current: 7/10 · Best: 3/10 · Worst: 8/10 · Aggravating factors: any prolonged posture, lifting , pushing, pulling · Relieving factors: nothing Dominant Side 
right Past Medical History/Comorbidities:  
Mr. Michael Swain  has a past medical history of Anxiety and H/O renal calculi (2002). Mr. Michael Swain  has no past surgical history on file. Social History/Living Environment:  
  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 Prior Level of Function/Work/Activity: 
Walk 45 min with dogs in woods. Current Medications:   
Current Outpatient Prescriptions:  
  tamsulosin (FLOMAX) 0.4 mg capsule, , Disp: , Rfl:  
  diclofenac EC (VOLTAREN) 75 mg EC tablet, Take 1 Tab by mouth two (2) times a day., Disp: 60 Tab, Rfl: 0 
  baclofen (LIORESAL) 10 mg tablet, Take 1 Tab by mouth three (3) times daily. , Disp: 45 Tab, Rfl: 0 
  pravastatin (PRAVACHOL) 10 mg tablet, Take 1 Tab by mouth nightly., Disp: 30 Tab, Rfl: 2 Date Last Reviewed:  9/19/2018 Number of Personal Factors/Comorbidities that affect the Plan of Care: 1-2: MODERATE COMPLEXITY EXAMINATION:  
 Observation/Orthostatic Postural Assessment:   
  Overall postural presentation WNL. Palpation:   
      Increased tone lumbar paraverterbrals ROM:   
AROM/PROM Lumbar Spine Eval Date: 8/22/18  Re-Assess Date:  Re-Assess Date:   
ACTIVE ROM (standing) RIGHT LEFT RIGHT LEFT RIGHT LEFT  
flexion 20% limitation Extension 20% limitation end range pain       
sidebending WNL  WNL Hip flexion WNL WNL IR WNL WNL      
ER WNL WNL Strength:   
Joint: Eval Date: 8/22/18  Re-Assess Date:  Re-Assess Date:   
 RIGHT LEFT RIGHT LEFT RIGHT LEFT  
HIP flex 4/5 4/5      
abduction 4/5 4/5      
extension 4/5 4/5 Knee extension  5/5 5/5 Knee  Flexion  5/5 5/5 Ankle dorsiflexion 5/5 5/5      
          plantarflexion 5/5 5/5 Joint Mobility Eval Date:  Re-Assess Date:  Re-Assess Date:   
 RIGHT LEFT RIGHT LEFT RIGHT LEFT  
lumbar Mild hypomobility extension with  PAs but non painful Special Tests:   
Slump test: negative, SLR negative for reproduction of sciatica. Positive for hamstring tightness bilateral 45 degrees. Neurological Screen: Assessed @ Initial Visit Radiating symptoms? Yes , patient complains of whole leg parathesias bilateral 
Functional Mobility:  Assessed @ Initial Visit Balance:  Assessed @ Initial Visit Single leg stance bilateral 30 sec + Functional Mobility: Patient able to perform full squat, shoulder mobility WNL, lumbar flexion 8 inches from floor. Body Structures Involved: 
1.  
2. Joints 3. Muscles 4. Body Functions Affected: 1. Sensory/Pain 2. Neuromusculoskeletal 
3. Movement Related Activities and Participation Affected: 1. Mobility 2. Self Care Number of elements that affect the Plan of Care: 3: MODERATE COMPLEXITY CLINICAL PRESENTATION:  
Presentation: Evolving clinical presentation with changing clinical characteristics: MODERATE COMPLEXITY CLINICAL DECISION MAKING:  
Outcome Measure: Tool Used: Disabilities of the Arm, Shoulder and Hand (DASH) Questionnaire - Quick Version Score:  Initial: 26/55  Most Recent: X/55 (Date: -- ) Interpretation of Score: The DASH is designed to measure the activities of daily living in person's with upper extremity dysfunction or pain. Each section is scored on a 1-5 scale, 5 representing the greatest disability. The scores of each section are added together for a total score of 55. Score 11 12-19 20-28 29-37 38-45 46-54 55 Modifier CH CI CJ CK CL CM CN ? Carrying, Moving, and Handling Objects:  
  - CURRENT STATUS: CJ - 20%-39% impaired, limited or restricted  - GOAL STATUS: CJ - 20%-39% impaired, limited or restricted  - D/C STATUS:  CI - 1%-19% impaired, limited or restricted Medical Necessity:  
· Skilled intervention continues to be required due to deficits and impairments seen upon initial evaluation affecting patient's participation in ADLs and functional tasks. ·  
Reason for Services/Other Comments: 
· Patient continues to require skilled intervention due to deficits and impairments seen upon initial evaluation affecting patient's participation in ADLs and functional tasks. Use of outcome tool(s) and clinical judgement create a POC that gives a: Questionable prediction of patient's progress: MODERATE COMPLEXITY  
TREATMENT:  
(In addition to Assessment/Re-Assessment sessions the following treatments were rendered) · Pre-Treatment Pain/ Symptoms:  
3/10 Has been doing more work around his house just stiff. THERAPEUTIC EXERCISE: (45 minutes):  Exercises per grid below to improve mobility, strength and balance. Required minimal visual and verbal cues to promote proper body alignment, promote proper body posture and promote proper body mechanics. Progressed resistance, range and repetitions as indicated. Date: 8/22/18 Date: 
8/30/18 Date: 
9/5/18 Date 9/12/18 Date 9/19/18 Activity/Exercise Parameters Parameters Parameters parameters parameters Patient education Pain science education, POC ex rational  Pain science basic principles ex rational Pain science Pain science priciples Pain science Hip hinge  3 x 10 10 x 10 x 10 x Bridge   3 x 10  3 x 10 3 x 10    
plank   10 x 10 x Cat camel  10 x 10 x 10x  10 x Clam shells  2 x 10 bilateral     
Treadmill   10 min , 2.7 mph 10 min 3  
 
 
 
 
 
 15 min Active hamstring stretch   Use purple band 10 x ea 10 x ea Dead lift   3 x 10 20 lbs 3 x 10 40 lb bandblack  3 x 10 60 lb purple band  
pallof press   Red band 20 x ea way Black band purple Sideways walking   60 feet red band 80 ft Orange band  
planks    1 min x 2 1 min x 2 Manual Therapy Interventions: (0 minutes): . Treatment/Session Assessment: Patient verbalized and demonstrated understanding of HEP. · Post-Treatment Pain/ Symptoms: 3 Pain= 3/10 Demonstrated good technique with dead lifting today. No increase in back pain. Very good tech plank. Despite good strength and ROM patient continues to complain of pain. ·  
Compliance with Program/Exercises: Patient has been walking more and doing some strengthening ex at home. Has not yet looked into reading material that was suggested. Patient having a some what difficult time accepting Pain Science principles. Has agreed that he will read the provided material over the next week. · Recommendations/Intent for next treatment session: \"Next visit will focus on advancements to more challenging activities\". Future Appointments Date Time Provider Oneyda Peter 9/19/2018 4:15 PM RT LUDY GrantOSRPT ROWDY  
9/26/2018 4:15 PM [facility] RT Smitha SFOSRPT ROWDY Total Treatment Duration: 
45 min T-ex Time in 17:00, out 17:50 Clayton Rainey, PT

## 2018-09-26 ENCOUNTER — HOSPITAL ENCOUNTER (OUTPATIENT)
Dept: PHYSICAL THERAPY | Age: 38
Discharge: HOME | End: 2018-09-26
Attending: FAMILY MEDICINE
Payer: COMMERCIAL

## 2018-09-26 PROCEDURE — 97110 THERAPEUTIC EXERCISES: CPT

## 2018-09-26 NOTE — PROGRESS NOTES
Kyle Macias : 1980 Payor: Denisha Bradshaw / Plan: [facility] / Product Type: PPO /  
 [address] [facility] [address] 38[address] [facility], [address] [place] [address], [address] [place] [address] [phone]   [phone] OUTPATIENT PHYSICAL THERAPY:Daily Note 2018 ICD-10: Treatment Diagnosis: Low back pain (M54.5) Muscle spasm of back (M62.830) Numbness and tingling of both lower extremities (R20.0, R20.2) Precautions/Allergies:  
Review of patient's allergies indicates no known allergies. Fall Risk Score: 1 (? 5 = High Risk) MD Orders: Eval and Treat  MEDICAL/REFERRING DIAGNOSIS: 
Low back pain at multiple sites DATE OF ONSET: 6/15/18 REFERRING PHYSICIAN: Beth Shine MD 
RETURN PHYSICIAN APPOINTMENT: TBD by patient INITIAL ASSESSMENT:   Mr. Michell Dobson presents to physical therapy with decreased strength, ROM, joint mobility, functional mobility, and chronic pain complaints. These S/S are consistent with Chronic low back pain. Patient will benefit from skilled physical therapy for pain science education, manual therapeutic techniques (as appropriate), therapeutic exercises and activities, neuromuscular re-education, and comprehensive home exercises program to address current impairments and functional limitations. Kyle Macias will benefit from skilled PT (medically necessary) in order to address above deficits affecting participation in basic ADLs and overall functional tolerance. PROBLEM LIST (Impacting functional limitations): 1. Decreased Strength 2. Decreased ADL/Functional Activities 3. Increased Pain 4. Decreased Activity Tolerance 5. Decreased Flexibility/Joint Mobility 6. Decreased [place] with Home Exercise Program INTERVENTIONS PLANNED: 
1. Pain Science Education 2. Home Exercise Program (HEP) 3. Manual Therapy 4. Neuromuscular Re-education/Strengthening 5. Range of Motion (ROM) 6. Therapeutic Activites 7. Therapeutic Exercise/Strengthening TREATMENT PLAN: 
Effective Dates: 8/22/18 TO 9/22/2018 (30 days). Frequency/Duration:1 x per week for 30 DaysGOALS: (Goals have been discussed and agreed upon with patient.) SHORT-TERM FUNCTIONAL GOALS: Time Frame: 3 weeks 1. Sobeida Mari will report <=5/10 pain with prolonged postures. 2. Sobeida Mari will demonstrate improvement in active lumbar flexion to WNL to show increased  participation in ADLs. 3. Sobeida Mari will demonstrate demonstrate improvement in the ability to lift 20 lbs from the floor without pain complaints. 4.  Sobeida Mari will show a greater than 8 point decrease on the Modified Oswestry in order to show an increase in lumbar function. 5. Sobeida Mari will be independent in all HEP and will have good understanding of pain management principles. DISCHARGE GOALS: Time Frame: 6 weeks 1. Sobeida Mari will show full ROM of the lumbar spine  in order to return to full functional mobility 2. Sobeida Mari will show a greater than 15 point decrease on the Oswestry in order to show an increase in spinal  function 3. Sobeida Mari will report  Being able to return to independent exercise program at a gym without fear of injury. 4. Sobeida Mari will be independent in all advanced HEP Rehabilitation Potential For Stated Goals: Good Regarding Alecia Yin therapy, I certify that the treatment plan above will be carried out by a therapist or under their direction. Thank you for this referral, 
RT Genna Referring Physician Signature: Carlitos Yoder MD            Date HISTORY:  
History of Present Injury/Illness (Reason for Referral): 
Patient reports that he first experienced back pain approximately 14 years ago while bowling. He had severe pain for 2-3 weeks. As a result of the injury he gave up sports because he was afraid of another injury.  Since that time he has had intermittent bouts of pain without any significant cause. He states that he has been seeing a chiropractor aproximately once a month for 20 years. He also has been seen by pain management for injections and physical therapy with only temporary relief of his symptoms. Most recent exacerbation started 2.5 months ago, no new injury, just a gradual increase in low back pain and bilateral leg numbness. Patient states that he is afraid to do any strenuous activity, he even is afraid to walk his dogs because they could pull suddenly and re-injury his back. Has recently tried steroids and muscle relaxants with no change in his symptoms. 
 
-Present symptoms/complaints (on day of evaluation) Pain is constant in nature Pain Scale: · Current: 7/10 · Best: 3/10 · Worst: 8/10 · Aggravating factors: any prolonged posture, lifting , pushing, pulling · Relieving factors: nothing Dominant Side 
right Past Medical History/Comorbidities:  
Mr. Rashawn Castaneda  has a past medical history of Anxiety and H/O renal calculi (2002). Mr. Rashawn Castaneda  has no past surgical history on file. Social History/Living Environment:  
  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 Prior Level of Function/Work/Activity: 
Walk 45 min with dogs in woods. Current Medications:   
Current Outpatient Prescriptions:  
  tamsulosin (FLOMAX) 0.4 mg capsule, , Disp: , Rfl:  
  diclofenac EC (VOLTAREN) 75 mg EC tablet, Take 1 Tab by mouth two (2) times a day., Disp: 60 Tab, Rfl: 0 
  baclofen (LIORESAL) 10 mg tablet, Take 1 Tab by mouth three (3) times daily. , Disp: 45 Tab, Rfl: 0 
  pravastatin (PRAVACHOL) 10 mg tablet, Take 1 Tab by mouth nightly., Disp: 30 Tab, Rfl: 2 Date Last Reviewed:  9/26/2018 Number of Personal Factors/Comorbidities that affect the Plan of Care: 1-2: MODERATE COMPLEXITY EXAMINATION:  
 Observation/Orthostatic Postural Assessment:   
  Overall postural presentation WNL. Palpation:   
      Increased tone lumbar paraverterbrals ROM:   
AROM/PROM Lumbar Spine Eval Date: 8/22/18 Re-Assess Date: Re-Assess Date:  
ACTIVE ROM (standing)RIGHT LEFT RIGHT LEFT RIGHT LEFT  
flexion 20% limitation Extension 20% limitation end range pain       
sidebending WNL  WNL Hip flexion WNL WNL IR WNL WNL      
ER WNL WNL Strength:   
Joint: Eval Date: 8/22/18 Re-Assess Date: Re-Assess Date:  
RIGHT LEFT RIGHT LEFT RIGHT LEFT  
HIP flex 4/5 4/5      
abduction 4/5 4/5      
extension 4/5 4/5 Knee extension  5/5 5/5 Knee  Flexion  5/5 5/5 Ankle dorsiflexion 5/5 5/5      
          plantarflexion 5/5 5/5 Joint Mobility Eval Date: Re-Assess Date: Re-Assess Date:  
RIGHT LEFT RIGHT LEFT RIGHT LEFT  
lumbar Mild hypomobility extension with  PAs but non painful Special Tests:   
Slump test: negative, SLR negative for reproduction of sciatica. Positive for hamstring tightness bilateral 45 degrees. Neurological Screen: Assessed @ Initial Visit Radiating symptoms? Yes , patient complains of whole leg parathesias bilateral 
Functional Mobility:  Assessed @ Initial Visit Balance:  Assessed @ Initial Visit Single leg stance bilateral 30 sec + Functional Mobility: Patient able to perform full squat, shoulder mobility WNL, lumbar flexion 8 inches from floor. Body Structures Involved: 
1.  
2. Joints 3. Muscles 4. Body Functions Affected: 1. Sensory/Pain 2. Neuromusculoskeletal 
3. Movement Related Activities and Participation Affected: 1. Mobility 2. Self Care Number of elements that affect the Plan of Care: 3: MODERATE COMPLEXITY CLINICAL PRESENTATION:  
Presentation: Evolving clinical presentation with changing clinical characteristics: MODERATE COMPLEXITY CLINICAL DECISION MAKING:  
Outcome Measure: Tool Used: Disabilities of the Arm, Shoulder and Hand (DASH) Questionnaire - Quick Version Score:  Initial: 26/55  Most Recent: X/55 (Date: -- ) Interpretation of Score: The DASH is designed to measure the activities of daily living in person's with upper extremity dysfunction or pain. Each section is scored on a 1-5 scale, 5 representing the greatest disability. The scores of each section are added together for a total score of 55. Score 11 12-19 20-28 29-37 38-45 46-54 55 Modifier CH CI CJ CK CL CM CN ? Carrying, Moving, and Handling Objects:  
  - CURRENT STATUS: CJ - 20%-39% impaired, limited or restricted  - GOAL STATUS: CJ - 20%-39% impaired, limited or restricted  - D/C STATUS:  CI - 1%-19% impaired, limited or restricted Medical Necessity:  
· Skilled intervention continues to be required due to deficits and impairments seen upon initial evaluation affecting patient's participation in ADLs and functional tasks. ·  
Reason for Services/Other Comments: 
· Patient continues to require skilled intervention due to deficits and impairments seen upon initial evaluation affecting patient's participation in ADLs and functional tasks. Use of outcome tool(s) and clinical judgement create a POC that gives a: Questionable prediction of patient's progress: MODERATE COMPLEXITY  
TREATMENT:  
(In addition to Assessment/Re-Assessment sessions the following treatments were rendered) · Pre-Treatment Pain/ Symptoms:  
3/10 Has been doing more work around his house just stiff. THERAPEUTIC EXERCISE: (45 minutes):  Exercises per grid below to improve mobility, strength and balance. Required minimal visual and verbal cues to promote proper body alignment, promote proper body posture and promote proper body mechanics. Progressed resistance, range and repetitions as indicated. Date: 8/22/18 Date: 
8/30/18 Date: 
9/5/18 Date 9/12/18 DATE  
9/26/18 Activity/Exercise Parameters Parameters Parameters parameters parameters Patient education Pain science education, POC ex rational  Pain science basic principles ex rational Pain science Pain science priciples Pain science principles Hip hinge  3 x 10 10 x 10 x 20 x Bridge   3 x 10  3 x 10 3 x 10    
plank   10 x 10 x Cat camel  10 x 10 x 10x  15 x Clam shells  2 x 10 bilateral     
Treadmill   10 min , 2.7 mph 10 min 3 Active hamstring stretch   Use purple band 10 x ea 10 x ea Dead lift   3 x 10 20 lbs 3 x 10 40 lb bandblack  3 x 10 60lb purple band    
pallof press   Red band 20 x ea way Black band Sideways walking   60 feet red band 80 ft   
planks    1 min x 2 Manual Therapy Interventions: (0 minutes): . Treatment/Session Assessment: Patient verbalized and demonstrated understanding of HEP. · Post-Treatment Pain/ Symptoms: 3 Pain= 3/10 Demonstrated good technique with dead lifting today. No increase in back pain. Very good tech plank. Despite good strength and ROM patient continues to complain of pain. ·  
Compliance with Program/Exercises: Patient has been walking more and doing some strengthening ex at home. Has not yet looked into reading material that was suggested. Patient having a some what difficult time accepting Pain Science principles. Has agreed that he will read the provided material over the next week. · Recommendations/Intent for next treatment session: \"Next visit will focus on advancements to more challenging activities\". No future appointments. Total Treatment Duration: 
45 min T-ex Time in 17:00, out 17:50 Leslie Rainey, PT

## 2018-11-02 PROBLEM — F41.1 GAD (GENERALIZED ANXIETY DISORDER): Status: ACTIVE | Noted: 2018-11-02

## 2019-09-16 PROBLEM — Z23 ENCOUNTER FOR IMMUNIZATION: Status: ACTIVE | Noted: 2019-09-16

## 2019-09-16 PROBLEM — F17.210 CIGARETTE NICOTINE DEPENDENCE WITHOUT COMPLICATION: Status: ACTIVE | Noted: 2019-09-16

## 2019-09-16 PROBLEM — Z00.00 PHYSICAL EXAM, ANNUAL: Status: ACTIVE | Noted: 2019-09-16

## 2019-09-19 PROBLEM — Z23 ENCOUNTER FOR IMMUNIZATION: Status: RESOLVED | Noted: 2019-09-16 | Resolved: 2019-09-19

## 2019-09-19 PROBLEM — Z00.00 PHYSICAL EXAM, ANNUAL: Status: RESOLVED | Noted: 2019-09-16 | Resolved: 2019-09-19

## 2020-06-17 ENCOUNTER — HOSPITAL ENCOUNTER (OUTPATIENT)
Dept: PHYSICAL THERAPY | Age: 40
Discharge: HOME | End: 2020-06-17
Payer: COMMERCIAL

## 2020-06-17 DIAGNOSIS — R20.0 NUMBNESS AND TINGLING OF LEFT UPPER EXTREMITY: ICD-10-CM

## 2020-06-17 DIAGNOSIS — R20.2 NUMBNESS AND TINGLING OF LEFT UPPER EXTREMITY: ICD-10-CM

## 2020-06-17 DIAGNOSIS — M62.838 MUSCLE SPASM OF LEFT SHOULDER: ICD-10-CM

## 2020-06-17 DIAGNOSIS — M54.2 NECK PAIN: ICD-10-CM

## 2020-06-17 PROCEDURE — 97161 PT EVAL LOW COMPLEX 20 MIN: CPT

## 2020-06-17 PROCEDURE — 97110 THERAPEUTIC EXERCISES: CPT

## 2020-06-17 PROCEDURE — 97140 MANUAL THERAPY 1/> REGIONS: CPT

## 2020-06-17 NOTE — PROGRESS NOTES
Angelica Brown  : 1980  Payor: Nathalie Lindo / Plan: [facility] / Product Type: PPO /  Rolly Ravindra at [address]. [facility] [address], [facility], [address] [place] [address]  [phone]   [phone]        OUTPATIENT PHYSICAL THERAPY: Daily Treatment Note 2020 Visit Count:  1    Treatment Diagnosis: Cervicalgia (M54.2)  Muscle Weakness (generalized) (M62.81)  Pain in thoracic spine (M54.6)  Radiculopathy, thoracic region (M54.14)  Pain in Left Shoulder (M25.512)  Precautions/Allergies:   Patient has no known allergies. MD Orders: Eval and Treat MEDICAL/REFERRING DIAGNOSIS:  Neck pain [M54.2]  Muscle spasm of left shoulder [M62.838]  Numbness and tingling of left upper extremity [R20.0, R20.2]   DATE OF ONSET: 2020  REFERRING PHYSICIAN: Gerald Rosario MD  RETURN PHYSICIAN APPOINTMENT:  2 weeks         Pre-treatment Symptoms/Complaints:  See Initial Eval Dated 20 for more details. Pain: Initial:6/10 Post Session: 5/10   Medications Last Reviewed:  2020     Updated Objective Findings: See Initial Eval for more details. Left Right Parameters    Strength 80 lbs 120lbs AFTER Manipulation         TREATMENT:   THERAPEUTIC EXERCISE: (22 minutes):  Exercises per grid below to improve mobility, strength and balance. Required minimal visual, verbal and manual cues to promote proper body alignment and promote proper body posture. Progressed resistance and complexity of movement as indicated. Date:  2020 Date:   Date:     Activity/Exercise Parameters Parameters Parameters   Education HEP, POC, PT goals, anatomy/pathology     Open Book (left/right) 1 min each side     Thread the needle 1 min each side     Quadruped Thoracic extension/rotation 1 min each side     Ys 10 x                       THERAPEUTIC ACTIVITY: ( 0 minutes):  Activities per gid below to improve functional movement related mobility, strength and balance to improve neuro-muscular carryover to daily functional activities for improving patient's quality of life. Required visual, verbal and manual cues to promote proper body alignment and promote proper body posture/mechanics. Progressed resistance and complexity of movement as indicated. Date:  6/18/2020 Date:   Date:     Activity/Exercise Parameters Parameters Parameters                                                                               MANUAL THERAPY: (8 minutes): Joint mobilization, Soft tissue mobilization was utilized and necessary because of the patient's restricted joint motion and restricted motion of soft tissue mobility. Date  6/17/2020    Technique Used Grade  Level # Time(s) Effect while being performed   Thoracic AP over foam roller V T4 - T7 regions 4 min Improve mobility and decrease pain   CT Junction Manipulation V  4 min Improve mobility and decrease pain                                                   HEP Log Date 1. Thread the needle, Quad trunk rotation, open book, Ys 6/17/2020   2.  6/18/2020   3. 6/18/2020   4.    5.           [facility] Portal  Treatment/Session Summary:    Response to Treatment: Pt demonstrated understanding of POC and initial HEP. No increase in pain or adverse reactions. Communication/Consultation:  POC, HEP, PT goals, Faxed initial evaluation to MD.   Equipment provided today: HEP Handout     Recommendations/Intent for next treatment session:   Next visit will focus on RTC strengthening soft tissue mobilization scapular stabilizer strengthening. Treatment Plan of Care Effective Dates: 6/17/2020 TO 7/18/2020 (30 days).   Frequency/Duration: 1 time a week for 30 Days         Total Treatment Billable Duration:   30  Rx plus Eval   PT Patient Time In/Time Out  Time In: 9592  Time Out: [address] E[address] Aamir Urrutia, PT    Future Appointments   Date Time Provider Oneyda Peter   6/22/2020  4:00 PM Jeovany Resendez PT [facility] [place] [facility]   6/24/2020  4:00 PM Elpidio Starcher, PT [facility]   6/29/2020  5:00 PM Elpidio Starcher, PT [facility]   6/30/2020  7:30 AM Andrade Nowak MD [facility]   7/1/2020  5:00 PM Elpidio Starcher, PT [facility]   7/6/2020  4:00 PM Elpidio Starcher, PT [facility]   7/8/2020  4:00 PM Elpidio Starcher, PT [facility]   7/13/2020  4:00 PM Elpidio Starcher, PT [facility]   7/15/2020  4:00 PM Elpidio Starcher, PT [facility]

## 2020-06-17 NOTE — THERAPY EVALUATION
Kathy Hoyt : 1980 Primary: [facility] [employer] Secondary:  [facility] [address] [place] [address], [address] [phone]   [phone] OUTPATIENT PHYSICAL THERAPY:Initial Assessment 2020 Treatment Diagnosis: Cervicalgia (M54.2) Muscle Weakness (generalized) (M62.81) Pain in thoracic spine (M54.6) Radiculopathy, thoracic region (M54.14) Pain in Left Shoulder (M25.512) Precautions/Allergies:  
Patient has no known allergies. MD Orders: Eval and Treat MEDICAL/REFERRING DIAGNOSIS: 
Neck pain [M54.2] Muscle spasm of left shoulder [M62.838] Numbness and tingling of left upper extremity [R20.0, R20.2] DATE OF ONSET: 2020 REFERRING PHYSICIAN: Aminah Preciado MD 
RETURN PHYSICIAN APPOINTMENT:  2 weeks INITIAL ASSESSMENT:  Mr. Kathy Hoyt presents to therapy with increased thoracic pain, radicular symptoms in radial nerve  decreased ROM, decreased strength, poor postural awareness, decreased functional tolerance consistent with  Thoracic muscle spasm and limited thoracic mobility impacting ability to complete work related tasks, carrying, pushing, pulling, and walking dogs. Kathy Hoyt will benefit from home exercise program, therapeutic and postural re-education, strengthening exercises, modalities, and manual therapeutic techniques (ie. Distraction myofascial release/soft tissue mobilization) as appropriate to address David Fend current condition. Kathy Hoyt will benefit from skilled PT to address above deficits affecting participation in ADLs and overall functional tolerance to return to prior level of function with less pain and HEP to maintain functional gains. PROBLEM LIST (Impacting functional limitations): 1. Decreased Strength 2. Decreased ADL/Functional Activities 3. Decreased Transfer Abilities 4. Increased Pain 5. Decreased Activity Tolerance 6. Increased Fatigue 7. Increased Shortness of Breath 8. Decreased Flexibility/Joint Mobility 9. Decreased [place] with Home Exercise Program INTERVENTIONS PLANNED: 
1. Balance Exercise 2. Bed Mobility 3. Cold 4. Cryotherapy 5. Family Education 6. Gait Training 7. Heat 8. Home Exercise Program (HEP) 9. Manual Therapy 10. Neuromuscular Re-education/Strengthening 11. Range of Motion (ROM) 12. Therapeutic Activites 13. Therapeutic Exercise/Strengthening TREATMENT PLAN: 
Effective Dates: 6/17/20 TO 7/17/2020 (30 days). Frequency/Duration: 1 time a week for 30 Days GOALS: (Goals have been discussed and agreed upon with patient.) Short-Term Goals: 15 days  Goal Met 1. Chinyere Garcia will improve NDI to 13/50 for improved functional tolerance and less pain with ADLs and work related tasks 1. [] Date: 2. Chinyere Garcia will improve  strength by 30 lbs on the right for improved ability to complete work related tasks. 2.  [] Date: 3. 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 3.  [] Date:  
    
 Long Term Goals: 30 days Goal Met 1. Chinyere Garcia  will be independent with HEP for thoracic region and UE's to sustain functional gains and pain management techniques made in therapy. 1.  [] Date: 2. Chinyere Garcia will demonstrate improved QuickDash by 15 points for improved participation in ADLs and IADLS. 2.  [] Date: 3. Chinyere Garcia will demonstrate lifting overhead  with 20 lbs in order to place items in overhead cabinet. 3.  [] Date: 4. Chinyere Garcia will improve shoulder external rotation MMT to >= 4+/5 to promote improved postural control and overhead reach. 4.  [] Date: 5. Chinyere Garcia will report <=2/10 pain to [facility] spine with performance of functional spinal mobility and rotation and minimal to no difficulty with such tasks. 5.  [] Date:  
 
 
Rehabilitation Potential For Stated Goals: GOOD Outcome Measure: Tool Used: Neck Disability Index (NDI) Score:  Initial: 23/50  Most Recent: X/50 (Date: -- ) Interpretation of Score: The Neck Disability Index is a revised form of the Oswestry Low Back Pain Index and is designed to measure the activities of daily living in person's with neck pain. Each section is scored on a 0-5 scale, 5 representing the greatest disability. The scores of each section are added together for a total score of 50. Tool Used: Disabilities of the Arm, Shoulder and Hand (DASH) Questionnaire - Quick Version Score:  Initial: 26/55  Most Recent: X/55 (Date: -- ) Interpretation of Score: The DASH is designed to measure the activities of daily living in person's with upper extremity dysfunction or pain. Each section is scored on a 1-5 scale, 5 representing the greatest disability. The scores of each section are added together for a total score of 55. Medical Necessity:  
· Skilled intervention continues to be required due to address above deficits affecting participation in basic ADLs and overall functional tolerance. Reason for Services/Other Comments: 
· Patient continues to require skilled intervention due to address above deficits affecting participation in basic ADLs and overall functional tolerance. Total Treatment Duration: 20 min plus treatment PT Patient Time In/Time Out Time In: 8620 Time Out: 4630 Regarding Talia Couch therapy, I certify that the treatment plan above will be carried out by a therapist or under their direction. Thank you for this referral, Joey Szymanski PT Referring Physician Signature: Jackson Rhodes MD              Date HISTORY:  
History of Injury/Illness (Reason for Referral): 
March 2020 pt woke with mid thoracic pain on the left side that radiates out to the left  Shoulder and along tricep region.  Pt experiences burning sensation. Pt reports NT into left hand on dorsal side. Pt reports dulled and diminished sensation. Pt reports slight weakness in left hand with wrist extension and lifting. Pt reports pain that shoots through from back forward to chest that appears to increase pain with inhalation. · Aggravating factors: pushing, pulling, carrying · Relieving factors: stretching Dominant Side:  
      RIGHT Pain Scale on day of evaluation: · Current: 5-6/10 · Worst: 8/10 Prior Level of Function/Work/Activity: 
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  
 
Other Clinical Tests:   
      Imaging: YES radiograaph Previous Treatment Approaches:   
      Massage and chiropractor. Social History/Living Environment:  
Pt lives in a one level home. Pt lives alone Past Medical History/Comorbidities:  
Mr. Boubacar Llanes  has a past medical history of Anxiety, Broken nose, Closed displaced fracture of proximal phalanx of left little finger, Fracture of phalanx of right index finger, and H/O renal calculi (2002). Mr. Boubacar Llanes  has a past surgical history that includes hx lithotripsy (2002). Ambulatory/Rehab Services H2 Model Falls Risk Assessment Risk Factors: 
     No Risk Factors Identified Ability to Rise from Chair: 
     (0)  Ability to rise in a single movement Falls Prevention Plan: No modifications necessary Total: (5 or greater = High Risk): 0  
 ©2010 [facility] [address] [place] [address] Patent #[id number]. Federal Law prohibits the replication, distribution or use without written permission from [facility] [employer] Current Medications: Current Outpatient Medications:  
  diclofenac EC (VOLTAREN) 75 mg EC tablet, Take 1 Tab by mouth two (2) times a day., Disp: 60 Tab, Rfl: 0 
  baclofen (LIORESAL) 20 mg tablet, Take 1 Tab by mouth nightly., Disp: 30 Tab, Rfl: 0 
  pravastatin (PRAVACHOL) 20 mg tablet, Take 1 Tab by mouth nightly., Disp: 90 Tab, Rfl: 1 
  ezetimibe (ZETIA) 10 mg tablet, Take 1 Tab by mouth daily. , Disp: 90 Tab, Rfl: 1 
  sertraline (ZOLOFT) 50 mg tablet, Take 1 Tab by mouth daily. , Disp: 90 Tab, Rfl: 1 Date Last Reviewed:  6/18/2020  
  
  0: LOW COMPLEXITY EXAMINATION:  
Observation/Orthostatic Postural Assessment:   
      Rounded shoulders, forward head Palpation: Muscle spasms noted in scapular region and along thoracic paraspinals wihtlimited mobility in all thoracic segments with PAs 
ROM:   
 
AROM/PROM Joint: Eval Date: 6/17/20  Re-Assess Date:  Re-Assess Date:   
 RIGHT LEFT RIGHT LEFT RIGHT LEFT Shoulder Flexion * shoulder ROM functional and WNL, however pt reporting tightness at end ranges. Shoulder Abduction Shoulder External Rotation Thoracic Flexion Thoracic Extension Strength:   
 Eval Date: 6/17/20  Re-Assess Date:  Re-Assess Date:   
  RIGHT LEFT RIGHT LEFT RIGHT LEFT Shoulder Flexion 4+/5 4-/5 Shoulder Abduction (C5) 4-/5 4-/5 Shoulder Internal Rotation 4+/5 4+/5 Shoulder External Rotation 4/5 4-/5 Elbow Flexion (C6) 5/5 4+/5 Elbow Extension (C7) 5/5 4/5 Wrist Flexion (C7) 5/5 5/5 Wrist Extension (C6) 4+/5 4/5 Resisted Thumb Extension/Finger Abduction (C8/T1) 3+/5   
 3+/5 Resisted Cervical Rotation (C1): NT       
 Strength 80 lb  65 lb Strength:  0-5 scale, 0 no muscle contraction; 1 no joint motion but contraction felt; 2 -less than full ROM in gravity eliminated; 2 full ROM in grav eliminated; 2+ full ROM in grav eliminated and lance to withstand minimal resist; 3-less than full ROM against gravity; 3 full ROM against gravity;  3+ full ROM against gravity and able to withstand minimal resist; 4- full ROM against gravity and able to withstand less than mod resist; 4 full ROM against gravity and able to withstand mod resist; 5 full ROM against gravity and able to withstand max resist.  
 
*Denies dysarthria, diplopia, drop attacks, dizziness, dysphagia Neurological Screen: Radiating symptoms? Yes in radial nerve distribution Body Structures Involved: 1. Nerves 2. Joints 3. Muscles 4. Ligaments Body Functions Affected: 1. Sensory/Pain 2. Neuromusculoskeletal 
3. Movement Related Activities and Participation Affected: 1. Mobility 2. Self Care Number of elements that affect the Plan of Care: LOW COMPLEXITY CLINICAL PRESENTATION:  
Presentation: Stable and uncomplicated: LOW COMPLEXITY CLINICAL DECISION MAKING:  
  
Use of outcome tool(s) and clinical judgement create a POC that gives a: Clear prediction of patient's progress: LOW COMPLEXITY

## 2020-06-22 ENCOUNTER — APPOINTMENT (OUTPATIENT)
Dept: PHYSICAL THERAPY | Age: 40
End: 2020-06-22
Payer: COMMERCIAL

## 2020-06-24 ENCOUNTER — HOSPITAL ENCOUNTER (OUTPATIENT)
Dept: PHYSICAL THERAPY | Age: 40
Discharge: HOME | End: 2020-06-24
Payer: COMMERCIAL

## 2020-06-24 PROCEDURE — 97530 THERAPEUTIC ACTIVITIES: CPT

## 2020-06-24 PROCEDURE — 97110 THERAPEUTIC EXERCISES: CPT

## 2020-06-24 NOTE — PROGRESS NOTES
Diana Torres  : 1980  Payor: Seth Whitley / Plan: [place] [facility] / Product Type: PPO /  [address] [facility] at [address] [place] [address]. [facility], [address], [facility], [address] [place] [address]  [phone]   [phone]        OUTPATIENT PHYSICAL THERAPY: Daily Treatment Note 2020 Visit Count:  2    Treatment Diagnosis: Cervicalgia (M54.2)  Muscle Weakness (generalized) (M62.81)  Pain in thoracic spine (M54.6)  Radiculopathy, thoracic region (M54.14)  Pain in Left Shoulder (M25.512)  Precautions/Allergies:   Patient has no known allergies. MD Orders: Eval and Treat MEDICAL/REFERRING DIAGNOSIS:  Neck pain [M54.2]  Muscle spasm of left shoulder [M62.838]  Numbness and tingling of left upper extremity [R20.0, R20.2]   DATE OF ONSET: 2020  REFERRING PHYSICIAN: Quentin Lentz MD  RETURN PHYSICIAN APPOINTMENT:  2 weeks         Pre-treatment Symptoms/Complaints:  Pt reports pain was relieved for 1-2 hours after last treatment session. Pain: Initial:6/10 Post Session: 5/10   Medications Last Reviewed:  2020     Updated Objective Findings: None today. TREATMENT:   THERAPEUTIC EXERCISE: (40 minutes):  Exercises per grid below to improve mobility, strength and balance. Required minimal visual, verbal and manual cues to promote proper body alignment and promote proper body posture. Progressed resistance and complexity of movement as indicated.      Date:  2020 Date:  20 Date:     Activity/Exercise Parameters Parameters Parameters   Education HEP, POC, PT goals, anatomy/pathology     Open Book (left/right) 1 min each side     Thread the needle 1 min each side     Quadruped Thoracic extension/rotation 1 min each side 2 min each side    Ys 10 x     Prone on elbows with rotation  3 min    UBE  3 min fwd and 3 min backward    Prone ATNR Push  1 min each side    Self thoracic Extension Mobs  Foam roller  3 min    Prone Ts to Ys   3 in yoga block barrier  3 x 10 reps    Door way Pec stretch  2 min    Self Trigger point with lacrosse ball  3 min          THERAPEUTIC ACTIVITY: ( 15 minutes): Activities per gid below to improve functional movement related mobility, strength and balance to improve neuro-muscular carryover to daily functional activities for improving patient's quality of life. Required visual, verbal and manual cues to promote proper body alignment and promote proper body posture/mechanics. Progressed resistance and complexity of movement as indicated. Date:  6/24/2020 Date:   Date:     Activity/Exercise Parameters Parameters Parameters   Forward Plank 3 x 30 sec       Up right Rows 45 lb bar  3 x 10 reps       Landmine press 45lb bar  3 x 10 reps                                                     MANUAL THERAPY: (0 minutes): Joint mobilization, Soft tissue mobilization was utilized and necessary because of the patient's restricted joint motion and restricted motion of soft tissue mobility. Date  6/24/2020    Technique Used Grade  Level # Time(s) Effect while being performed   Thoracic AP over foam roller V T4 - T7 regions 4 min Improve mobility and decrease pain   CT Junction Manipulation V  4 min Improve mobility and decrease pain                                                   HEP Log Date 1. Thread the needle, Quad trunk rotation, open book, Ys 6/17/2020   2. Ts to Ys 6/24/2020   3. 6/24/2020   4.    5.           [employer] Portal  Treatment/Session Summary:    Response to Treatment: Pt reports no increase in thoracic pain with all exercises today. Pt is able to complete lifting and pushing tasks to simulate work related activities with no c/o pain in thoracic spine. Pt returns demo of new exercises and trigger point muscle release with good technique and safety. Communication/Consultation:  None today.    Equipment provided today: HEP Handout     Recommendations/Intent for next treatment session:   Next visit will focus on RTC strengthening soft tissue mobilization scapular stabilizer strengthening. Treatment Plan of Care Effective Dates: 6/17/2020 TO 7/18/2020 (30 days).   Frequency/Duration: 1 time a week for 30 Days       Total Treatment Billable Duration:   55  Rx   PT Patient Time In/Time Out  Time In: 2381  Time Out: Doretha Javier 77, PT    Future Appointments   Date Time Provider Oneyda Peter   6/30/2020  7:30 AM Jose Miguel Villatoro MD [facility]   7/1/2020  5:00 PM Guero Moy, PT [facility]   7/8/2020  4:00 PM FAHE[facility]   7/15/2020  4:00 PM FAHEEM Duarte [facility]

## 2020-06-29 ENCOUNTER — APPOINTMENT (OUTPATIENT)
Dept: PHYSICAL THERAPY | Age: 40
End: 2020-06-29
Payer: COMMERCIAL

## 2020-07-01 ENCOUNTER — HOSPITAL ENCOUNTER (OUTPATIENT)
Dept: PHYSICAL THERAPY | Age: 40
Discharge: HOME | End: 2020-07-01
Payer: COMMERCIAL

## 2020-07-01 PROCEDURE — 97530 THERAPEUTIC ACTIVITIES: CPT

## 2020-07-01 PROCEDURE — 97110 THERAPEUTIC EXERCISES: CPT

## 2020-07-01 NOTE — PROGRESS NOTES
Tanner Seen  : 1980  Payor: Tammie Beckett / Plan: [facility] / Product Type: PPO /  Guille Tong at [address] [facility], [address], [facility], [address] [place] [address]  [phone]   [phone]        OUTPATIENT PHYSICAL THERAPY: Daily Treatment Note 2020 Visit Count:  3    Treatment Diagnosis: Cervicalgia (M54.2)  Muscle Weakness (generalized) (M62.81)  Pain in thoracic spine (M54.6)  Radiculopathy, thoracic region (M54.14)  Pain in Left Shoulder (M25.512)  Precautions/Allergies:   Patient has no known allergies. MD Orders: Eval and Treat MEDICAL/REFERRING DIAGNOSIS:  Neck pain [M54.2]  Muscle spasm of left shoulder [M62.838]  Numbness and tingling of left upper extremity [R20.0, R20.2]   DATE OF ONSET: 2020  REFERRING PHYSICIAN: Gloria Ruano MD  RETURN PHYSICIAN APPOINTMENT:  2 weeks         Pre-treatment Symptoms/Complaints:  Pt reports increased soreness after last session and had to get a massage, however pt had no relief with massage. Pain: Initial:6/10 thoracic region and left shoulder Post Session:4/10 thoracic region and left shoulder   Medications Last Reviewed:  2020     Updated Objective Findings: None today. TREATMENT:   THERAPEUTIC EXERCISE: (25 minutes):  Exercises per grid below to improve mobility, strength and balance. Required minimal visual, verbal and manual cues to promote proper body alignment and promote proper body posture. Progressed resistance and complexity of movement as indicated.      Date:  2020 Date:  20 Date:  20   Activity/Exercise Parameters Parameters Parameters   Education HEP, POC, PT goals, anatomy/pathology     Open Book (left/right) 1 min each side     Thread the needle 1 min each side     Quadruped Thoracic extension/rotation 1 min each side 2 min each side    Ys 10 x     Prone on elbows with rotation  3 min    UBE  3 min fwd and 3 min backward 3 min fwd and 3 min backward   Prone ATNR Push  1 min each side    Self thoracic Extension Mobs  Foam roller  3 min    Prone Ts to Ys   3 in yoga block barrier  3 x 10 reps    Door way Pec stretch  2 min    Self Trigger point with lacrosse ball  3 min 3 min   Wall slides   10 lbs with lift off  3 x 10 reps   Dynamic LTR 90/90   2 min   Serratus Initiation   Red t-band  2 x 1 min         THERAPEUTIC ACTIVITY: ( 30 minutes): Activities per gid below to improve functional movement related mobility, strength and balance to improve neuro-muscular carryover to daily functional activities for improving patient's quality of life. Required visual, verbal and manual cues to promote proper body alignment and promote proper body posture/mechanics. Progressed resistance and complexity of movement as indicated. Date:  6/24/2020 Date:  7/1/20 Date:     Activity/Exercise Parameters Parameters Parameters   Forward Plank 3 x 30 sec       Up right Rows 45 lb bar  3 x 10 reps       Landmine press 45lb bar  3 x 10 reps       Scorpion kick   4 min left/right     Jujitsu hip and trunk rotation   4 min left/right     Bear crawl Rocking   3 x 30 sec     Quadruped lower trunk rotation   2 min           MANUAL THERAPY: (0 minutes): Joint mobilization, Soft tissue mobilization was utilized and necessary because of the patient's restricted joint motion and restricted motion of soft tissue mobility. Date  7/1/2020    Technique Used Grade  Level # Time(s) Effect while being performed   Thoracic AP over foam roller V T4 - T7 regions 4 min Improve mobility and decrease pain   CT Junction Manipulation V  4 min Improve mobility and decrease pain                                                   HEP Log Date 1. Thread the needle, Quad trunk rotation, open book, Ys 6/17/2020   2.  Ts to Ys 6/24/2020   3. 7/1/2020   4.    5.           [employer] Portal  Treatment/Session Summary:    Response to Treatment: Pt reporting no mid thoracic pain after mobilizations to hips and lower spine region. Pt does continue to have left shoulder discomfort however it does no radiate down arm or in thoracic region by end of session. Pt with decreased scapular stability and decreased muscular endurance as noted by inability to sustain abduction and ER isometrics with forward flexion overhead with recruitment of cervical muscles noted. Communication/Consultation:  None today. Equipment provided today: HEP Handout     Recommendations/Intent for next treatment session:   Next visit will focus on RTC strengthening soft tissue mobilization scapular stabilizer strengthening. Treatment Plan of Care Effective Dates: 6/17/2020 TO 7/18/2020 (30 days).   Frequency/Duration: 1 time a week for 30 Days       Total Treatment Billable Duration:   55  Rx   PT Patient Time In/Time Out  Time In: 0002  Time Out: Malaika Deputado [address], PT    Future Appointments   Date Time Provider Oneyda Peter   7/8/2020  4:00 PM Brad Houser, PT [facility] AND [facility]   7/15/2020  4:00 PM Brad Houser PT [facility]

## 2020-07-06 ENCOUNTER — APPOINTMENT (OUTPATIENT)
Dept: PHYSICAL THERAPY | Age: 40
End: 2020-07-06
Payer: COMMERCIAL

## 2020-07-08 ENCOUNTER — HOSPITAL ENCOUNTER (OUTPATIENT)
Dept: PHYSICAL THERAPY | Age: 40
Discharge: HOME | End: 2020-07-08
Payer: COMMERCIAL

## 2020-07-08 PROCEDURE — 97530 THERAPEUTIC ACTIVITIES: CPT

## 2020-07-08 PROCEDURE — 97110 THERAPEUTIC EXERCISES: CPT

## 2020-07-08 NOTE — PROGRESS NOTES
Johanne Gillis  : 1980  Payor: Gt Bennett / Plan: [facility] / Product Type: PPO /  [place] at [address], [place], [address] [place] [address] Road  [phone]   [phone]        OUTPATIENT PHYSICAL THERAPY: Daily Treatment Note 2020 Visit Count:  4    Treatment Diagnosis: Cervicalgia (M54.2)  Muscle Weakness (generalized) (M62.81)  Pain in thoracic spine (M54.6)  Radiculopathy, thoracic region (M54.14)  Pain in Left Shoulder (M25.512)  Precautions/Allergies:   Patient has no known allergies. MD Orders: Eval and Treat MEDICAL/REFERRING DIAGNOSIS:  Neck pain [M54.2]  Muscle spasm of left shoulder [M62.838]  Numbness and tingling of left upper extremity [R20.0, R20.2]   DATE OF ONSET: 2020  REFERRING PHYSICIAN: Ramon Sterling MD  RETURN PHYSICIAN APPOINTMENT:  2 weeks         Pre-treatment Symptoms/Complaints:  Pt reports increased soreness after last session and had to get a massage, however pt had no relief with massage. Pain: Initial:/10 thoracic region and left shoulder Post Session:4/10 thoracic region and left shoulder   Medications Last Reviewed:  2020     Updated Objective Findings: None today.  Strength prior to thoracic manipulation:  Left:113 lbs Right::110 Lbs   Strength after Manipulation:  Left: 114 lb Right: 125 lbs     TREATMENT:   THERAPEUTIC EXERCISE: (23 minutes):  Exercises per grid below to improve mobility, strength and balance. Required minimal visual, verbal and manual cues to promote proper body alignment and promote proper body posture. Progressed resistance and complexity of movement as indicated.      Date:  2020 Date:  20 Date:  20 Date:  20   Activity/Exercise Parameters Parameters Parameters    Education HEP, POC, PT goals, anatomy/pathology      Open Book (left/right) 1 min each side      Thread the needle 1 min each side   2 min   Quadruped Thoracic extension/rotation 1 min each side 2 min each side  2 min left/right   Ys 10 x      Prone on elbows with rotation  3 min     UBE  3 min fwd and 3 min backward 3 min fwd and 3 min backward 3 min fwd and 3 min backward   Prone ATNR Push  1 min each side  2 min each side   Self thoracic Extension Mobs  Foam roller  3 min     Prone Ts to Ys   3 in yoga block barrier  3 x 10 reps     Door way Pec stretch  2 min     Self Trigger point with lacrosse ball  3 min 3 min    Wall slides   10 lbs with lift off  3 x 10 reps    Dynamic LTR 90/90   2 min    Serratus Initiation   Red t-band  2 x 1 min    Chair Thoracic Extension self mobilization    2 min         THERAPEUTIC ACTIVITY: ( 28 minutes): Activities per gid below to improve functional movement related mobility, strength and balance to improve neuro-muscular carryover to daily functional activities for improving patient's quality of life. Required visual, verbal and manual cues to promote proper body alignment and promote proper body posture/mechanics. Progressed resistance and complexity of movement as indicated. Date:  6/24/2020 Date:  7/1/20 Date:  7/8/20   Activity/Exercise Parameters Parameters Parameters   Forward Plank 3 x 30 sec       Unilateral Up right Rows 45 lb bar  3 x 10 reps   55lbs  3 x 10 reps   Left/right   Landmine press 45lb bar  3 x 10 reps   55lb bar  3 x 10 reps  1/2 kneeling position   Scorpion kick   4 min left/right 4 min left/right   Jujitsu hip and trunk rotation   4 min left/right     Bear crawl Rocking   3 x 30 sec     Quadruped lower trunk rotation   2 min 2 min   Deadlift    45lb bar  2 x 10 reps   Upright row    45 lb  2 x 10 reps         MANUAL THERAPY: (2 minutes): Joint mobilization, Soft tissue mobilization was utilized and necessary because of the patient's restricted joint motion and restricted motion of soft tissue mobility.         Date  7/8/2020    Technique Used Grade  Level # Time(s) Effect while being performed Thoracic AP over foam roller V T4 - T7 regions 1  min Improve mobility and decrease pain   CT Junction Manipulation V  1 min Improve mobility and decrease pain                                                   HEP Log Date 1. Thread the needle, Quad trunk rotation, open book, Ys 6/17/2020   2. Ts to Ys 6/24/2020   3. Chair self thoracic mobilization 7/8/2020   4.    5.           [employer] Portal  Treatment/Session Summary:    Response to Treatment: Pt with significant improvement in overall  strength since initial eval that has maintained since eval. Pt with a 15 lb improvement on the right  strength after manipulation today. Pt is able to complete full range scorpion kick without restriction or pain today. Communication/Consultation:  None today. Equipment provided today: HEP Handout     Recommendations/Intent for next treatment session:   Next visit will focus on RTC strengthening soft tissue mobilization scapular stabilizer strengthening. Treatment Plan of Care Effective Dates: 6/17/2020 TO 7/18/2020 (30 days).   Frequency/Duration: 1 time a week for 30 Days       Total Treatment Billable Duration:   53  Rx   PT Patient Time In/Time Out  Time In: 1600  Time Out: [address] [facility] Alex, PT    Future Appointments   Date Time Provider Oneyda ePter   7/15/2020  4:00 PM Wilmer Anguiano, PT [facility] AND [facility]

## 2020-07-13 ENCOUNTER — APPOINTMENT (OUTPATIENT)
Dept: PHYSICAL THERAPY | Age: 40
End: 2020-07-13
Payer: COMMERCIAL

## 2020-07-15 ENCOUNTER — HOSPITAL ENCOUNTER (OUTPATIENT)
Dept: PHYSICAL THERAPY | Age: 40
Discharge: HOME | End: 2020-07-15
Payer: COMMERCIAL

## 2020-07-15 PROCEDURE — 97110 THERAPEUTIC EXERCISES: CPT

## 2020-07-15 PROCEDURE — 97530 THERAPEUTIC ACTIVITIES: CPT

## 2020-07-15 NOTE — PROGRESS NOTES
Desiree Everett  : 1980  Payor: Dianelys Morris / Plan: [place] [facility] / Product Type: PPO /  [address] at [address] [facility], [address], [facility], [address] [place] [address]  [phone]   [phone]        OUTPATIENT PHYSICAL THERAPY: Daily Treatment Note 7/15/2020 Visit Count:  5    Treatment Diagnosis: Cervicalgia (M54.2)  Muscle Weakness (generalized) (M62.81)  Pain in thoracic spine (M54.6)  Radiculopathy, thoracic region (M54.14)  Pain in Left Shoulder (M25.512)  Precautions/Allergies:   Patient has no known allergies. MD Orders: Eval and Treat MEDICAL/REFERRING DIAGNOSIS:  Neck pain [M54.2]  Muscle spasm of left shoulder [M62.838]  Numbness and tingling of left upper extremity [R20.0, R20.2]   DATE OF ONSET: 2020  REFERRING PHYSICIAN: Kranthi Thao MD  RETURN PHYSICIAN APPOINTMENT:  2 weeks         Pre-treatment Symptoms/Complaints:  Pt reports increased strength overall and improved function, but he still has pain that does not appear to be getting better  Pain: Initial:does not rate/10 thoracic region and left shoulder Post Session: does not rate/10 thoracic region and left shoulder   Medications Last Reviewed:  7/15/2020     Updated Objective Findings: None today.  Strength prior to thoracic manipulation:  Left:113 lbs Right::110 Lbs   Strength after Manipulation:  Left: 114 lb Right: 120 lbs     TREATMENT:   THERAPEUTIC EXERCISE: (23 minutes):  Exercises per grid below to improve mobility, strength and balance. Required minimal visual, verbal and manual cues to promote proper body alignment and promote proper body posture. Progressed resistance and complexity of movement as indicated.      Date:  2020 Date:  20 Date:  20 Date:  20 Date:  7/15/20   Activity/Exercise Parameters Parameters Parameters     Education HEP, POC, PT goals, anatomy/pathology    Assessment of NDI and Quick DASH to assess functional changes. Strength testing and goal assessment. Open Book (left/right) 1 min each side       Thread the needle 1 min each side   2 min    Quadruped Thoracic extension/rotation 1 min each side 2 min each side  2 min left/right 2 min left/right   Ys 10 x       Prone on elbows with rotation  3 min      UBE  3 min fwd and 3 min backward 3 min fwd and 3 min backward 3 min fwd and 3 min backward 3 min fwd and 3 min backward   Prone ATNR Push  1 min each side  2 min each side    Self thoracic Extension Mobs  Foam roller  3 min      Prone Ts to Ys   3 in yoga block barrier  3 x 10 reps      Door way Pec stretch  2 min      Self Trigger point with lacrosse ball  3 min 3 min     Wall slides   10 lbs with lift off  3 x 10 reps     Dynamic LTR 90/90   2 min     Serratus Initiation   Red t-band  2 x 1 min     Chair Thoracic Extension self mobilization    2 min 3 min         THERAPEUTIC ACTIVITY: (28 minutes): Activities per gid below to improve functional movement related mobility, strength and balance to improve neuro-muscular carryover to daily functional activities for improving patient's quality of life. Required visual, verbal and manual cues to promote proper body alignment and promote proper body posture/mechanics. Progressed resistance and complexity of movement as indicated.      Date:  6/24/2020 Date:  7/1/20 Date:  7/8/20 Date:  7/15/20   Activity/Exercise Parameters Parameters Parameters    Forward Plank 3 x 30 sec         Unilateral Up right Rows 45 lb bar  3 x 10 reps   55lbs  3 x 10 reps   Left/right 55lbs  3 x 10 reps   Left/right   Landmine press 45lb bar  3 x 10 reps   55lb bar  3 x 10 reps  1/2 kneeling position 55lb bar  2 x 10 reps  1/2 kneeling position   Scorpion kick   4 min left/right 4 min left/right    Jujitsu hip and trunk rotation   4 min left/right       Bear crawl Rocking   3 x 30 sec       Quadruped lower trunk rotation   2 min 2 min    Deadlift    45lb bar  2 x 10 reps 45lb bar  2 x 10 reps   Upright row    45 lb  2 x 10 reps 45 lb  2 x 10 reps   Thoracic Extension in squat     10 lb  3 x 10 reps         MANUAL THERAPY: (4 minutes): Joint mobilization, Soft tissue mobilization was utilized and necessary because of the patient's restricted joint motion and restricted motion of soft tissue mobility. Date  7/15/2020    Technique Used Grade  Level # Time(s) Effect while being performed   Thoracic AP over foam roller V T4 - T7 regions 2  min Improve mobility and decrease pain   CT Junction Manipulation V  2 min Improve mobility and decrease pain                                                   HEP Log Date 1. Thread the needle, Quad trunk rotation, open book, Ys 6/17/2020   2. Ts to Ys 6/24/2020   3. Chair self thoracic mobilization 7/8/2020   4.    5.           [employer] Portal  Treatment/Session Summary:    Response to Treatment: Pt has met 2/3 STGs and 3/5LTGs over course of therapy intervention. See d/c summary for further details. Communication/Consultation:  D/c sent to MD.   Equipment provided today: None today     Recommendations/Intent for next treatment session:   D/c with HEP      Treatment Plan of Care Effective Dates: 6/17/2020 TO 7/18/2020 (30 days). Frequency/Duration: 1 time a week for 30 Days       Total Treatment Billable Duration:   55  Rx   PT Patient Time In/Time Out  Time In: 1600  Time Out: 1655  Karen Gonzales PT    No future appointments.

## 2020-07-15 NOTE — THERAPY DISCHARGE
Catie Pruitt  : 1980  Primary: [facility] [facility]  Secondary:  [facility] Berthamaritzachavaana rosa Ravi   [address], [address]  [phone]   [phone]      OUTPATIENT PHYSICAL THERAPY: Discharge Assessment 7/15/2020    Treatment Diagnosis: Cervicalgia (M54.2)  Muscle Weakness (generalized) (M62.81)  Pain in thoracic spine (M54.6)  Radiculopathy, thoracic region (M54.14)  Pain in Left Shoulder (M25.512)  Precautions/Allergies:   Patient has no known allergies. MD Orders: Eval and Treat MEDICAL/REFERRING DIAGNOSIS:  Neck pain   DATE OF ONSET: 2020  REFERRING PHYSICIAN: Estuardo Lim MD  RETURN PHYSICIAN APPOINTMENT:  20     Discharge ASSESSMENT:  Mr. Catie Pruitt has met 2/3 STGs and 3/5 LTGs over course of therapy intervention. Pt is able to lift 30 lbs over head, has improved overall thoracic and lumbar ranges, pt reports a decrease in intensity of NT into hand, and is adherent to daily exercise program. Pt continues to have thoracic pain that is high in intensity and can not be relieved for more than a few minutes with mobilizations and strengthening exercises. Pt is able to continue with HEP independently to maintain functional gains and manage pain. PT POC Closed. TREATMENT PLAN:  Effective Dates: 20 TO 2020 (30 days). Frequency/Duration: 1 time a week for 30 Days    GOALS: (Goals have been discussed and agreed upon with patient.)   Short-Term Goals: 15 days  Goal Met   1. Catie Pruitt will improve NDI to 13/50 for improved functional tolerance and less pain with ADLs and work related tasks 1. [] Date: NOT MET, 15/50   2. Catie Pruitt will improve  strength by 30 lbs on the right for improved ability to complete work related tasks. 2.  [x] Date:7/15/20   3. 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   3.  [x] Date: 7/15/20         Long Term Goals: 27 days Goal Met   1. Leopoldo Littlejohn  will be independent with HEP for thoracic region and UE's to sustain functional gains and pain management techniques made in therapy. 1.  [x] Date: 7/15/20   2. Leopoldo Littlejohn will demonstrate improved QuickDash by 15 points for improved participation in ADLs and IADLS. 2.  [] Date: NOT MET, improved by 2 pts   3. Leopoldo Littlejohn will demonstrate lifting overhead  with 20 lbs in order to place items in overhead cabinet. 3.  [x] Date: 7/15/20   4. Leopoldo Littlejohn will improve shoulder external rotation MMT to >= 4+/5 to promote improved postural control and overhead reach. 4.  [x] Date 7/15/20   5. Leopoldo Littlejohn will report <=2/10 pain to [facility] spine with performance of functional spinal mobility and rotation and minimal to no difficulty with such tasks. 5.  [] Date: NOT MET, pain can go up to 6/10       Rehabilitation Potential For Stated Goals: GOOD    Outcome Measure: Tool Used: Neck Disability Index (NDI)  Score:  Initial: 23/50  Most Recent: 15/50 (Date: 7/15/20 )   Interpretation of Score: The Neck Disability Index is a revised form of the Oswestry Low Back Pain Index and is designed to measure the activities of daily living in person's with neck pain. Each section is scored on a 0-5 scale, 5 representing the greatest disability. The scores of each section are added together for a total score of 50. Tool Used: Disabilities of the Arm, Shoulder and Hand (DASH) Questionnaire - Quick Version  Score:  Initial: 26/55  Most Recent: 24/55 (Date: 7/15/20 )   Interpretation of Score: The DASH is designed to measure the activities of daily living in person's with upper extremity dysfunction or pain. Each section is scored on a 1-5 scale, 5 representing the greatest disability. The scores of each section are added together for a total score of 55.         Medical Necessity:   · Skilled intervention continues to be required due to address above deficits affecting participation in basic ADLs and overall functional tolerance. Reason for Services/Other Comments:  · Patient continues to require skilled intervention due to address above deficits affecting participation in basic ADLs and overall functional tolerance. Total Treatment Duration: 55 min, see daily note for details  PT Patient Time In/Time Out  Time In: 1600  Time Out: 1655    Regarding Bartholome Primas therapy, I certify that the treatment plan above will be carried out by a therapist or under their direction. Thank you for this referral,  Alexsander Philip PT     Referring Physician Signature: Karina Moses MD No signature needed. EXAMINATION:   ROM:      AROM/PROM                  Joint: Eval Date: 6/17/20  Re-Assess Date:  Re-Assess Date:     RIGHT LEFT RIGHT LEFT RIGHT LEFT   Shoulder Flexion * shoulder ROM functional and WNL, however pt reporting tightness at end ranges.          Shoulder Abduction         Shoulder External Rotation         Thoracic Flexion         Thoracic Extension               Strength:     Eval Date: 6/17/20  Re-Assess Date: 7/15/20  Re-Assess Date:      RIGHT LEFT RIGHT LEFT RIGHT LEFT   Shoulder Flexion 4+/5 4-/5       Shoulder Abduction (C5) 4-/5 4-/5       Shoulder Internal Rotation 4+/5 4+/5       Shoulder External Rotation 4/5 4-/5 4+/5 4+/5     Elbow Flexion (C6) 5/5 4+/5       Elbow Extension (C7) 5/5 4/5       Wrist Flexion (C7) 5/5 5/5       Wrist Extension (C6) 4+/5 4/5       Resisted Thumb Extension/Finger Abduction (C8/T1) 3+/5     3+/5       Resisted Cervical Rotation (C1): NT         Strength 80 lb  65 lb 115 120              Strength:  0-5 scale, 0 no muscle contraction; 1 no joint motion but contraction felt; 2 -less than full ROM in gravity eliminated; 2 full ROM in grav eliminated; 2+ full ROM in grav eliminated and lance to withstand minimal resist; 3-less than full ROM against gravity; 3 full ROM against gravity;  3+ full ROM against gravity and able to withstand minimal resist; 4- full ROM against gravity and able to withstand less than mod resist; 4 full ROM against gravity and able to withstand mod resist; 5 full ROM against gravity and able to withstand max resist.

## 2020-08-12 ENCOUNTER — HOSPITAL ENCOUNTER (OUTPATIENT)
Dept: PHYSICAL THERAPY | Age: 40
Discharge: HOME | End: 2020-08-12
Payer: COMMERCIAL

## 2020-08-12 PROCEDURE — 97110 THERAPEUTIC EXERCISES: CPT

## 2020-08-12 PROCEDURE — 97161 PT EVAL LOW COMPLEX 20 MIN: CPT

## 2020-08-12 PROCEDURE — 97162 PT EVAL MOD COMPLEX 30 MIN: CPT

## 2020-08-12 NOTE — THERAPY EVALUATION
Rocky Guzmán  : 1980  Primary: [facility] [facility]  Secondary:  [facility] [place] [address]. [facility], [address]  [phone]   [phone]      OUTPATIENT PHYSICAL THERAPY:Initial Assessment 2020    Treatment Diagnosis: Cervicalgia (M54.2)  Headache (R51)  Muscle Weakness (generalized) (M62.81)  Pain in thoracic spine (M54.6)  Precautions/Allergies:   Patient has no known allergies. MD Orders: Eval and Treat MEDICAL/REFERRING DIAGNOSIS:  Cervical disc disorder with radiculopathy, unspecified cervical region [M50.10]  Other cervical disc degeneration, unspecified cervical region [M50.30]  Radiculopathy, cervical region [M54.12]   DATE OF ONSET: Chronic  REFERRING PHYSICIAN: Lori Thomas PA  RETURN PHYSICIAN APPOINTMENT:  TBD by patient     INITIAL ASSESSMENT:  Mr. Rocky Guzmán presents with increased pain, decreased cervical and thoracic ROM, decreased strength, headaches, decreased functional tolerance consistent with  Cervical and thoracic pain. Pt had a recent MRI in which there was noted stenosis in the cervical region. Pt completed a recent bout of outpatient therapy to address the same issue and has maintained his functional capacity and strength overall. Pt no longer c/o radicular symptoms into arm, but has frequent headaches and sensitivity to touch. Pt was recommended to have another bout of therapy to have cervical traction and dry needling. Rocky Guzmán will benefit from home exercise program, therapeutic and postural re-education, strengthening exercises, modalities, and manual therapeutic techniques (ie. Distraction myofascial release/soft tissue mobilization) as appropriate to address Cosimo Hung current condition. Rocky Guzmán will benefit from skilled PT to address above deficits affecting participation in ADLs and overall functional tolerance.      PROBLEM LIST (Impacting functional limitations):  1. Decreased Strength  2. Decreased ADL/Functional Activities  3. Decreased Transfer Abilities  4. Increased Pain  5. Decreased Activity Tolerance  6. Increased Fatigue  7. Increased Shortness of Breath  8. Decreased Flexibility/Joint Mobility  9. Decreased [place] with Home Exercise Program INTERVENTIONS PLANNED:  1. Balance Exercise  2. Bed Mobility  3. Cold  4. Cryotherapy  5. Family Education  6. Gait Training  7. Heat  8. Home Exercise Program (HEP)  9. Manual Therapy  10. Neuromuscular Re-education/Strengthening  11. Range of Motion (ROM)  12. Therapeutic Activites  13. Therapeutic Exercise/Strengthening  14. Dry needling  15. Traction   TREATMENT PLAN:  Effective Dates: 8/12/20 TO 10/11/2020 (60 days). Frequency/Duration: 2 times a week for 60 Days    GOALS: (Goals have been discussed and agreed upon with patient.)   Short-Term Goals: 30 days  Goal Met   1. Julio Alvarez will improve rotation ROM by  >=10 degree to assist with improved function and less pain with driving. 1.  [] Date:   2. Julio Alvarez will improve extension ROM by  >= 5 degree to assist with improved function and less pain with looking up. 2.  [] Date:   3. Julio Alvarez will be able to carry 50 lbs over >=150ft to carry objects for ADLs and IADLs. 3.  [] Date:         Long Term Goals: 60 days Goal Met   1. Julio Alvarez  will be independent with HEP for cervical region and UE's to sustain functional gains and pain management techniques made in therapy. 1.  [] Date:   2. Julio Alvarez will demonstrate improved NDI score by greater than or equal to 7 points indicating improved cervical mobility and strengthen for improved participation in ADLs and IADLS. 2.  [] Date:   3. Julio Alvarez will demonstrate lifting overhead  with 30 lbs in order to place items in overhead cabinet. 3.  [] Date:   4.  Julio Alvarez will improve shoulder external rotation MMT to >= 5/5 to promote improved postural control and overhead reach. 4.  [] Date:   5. Julio Alvarez will demonstrate ability to lift 100 lbs from the floor in order to complete household chores. 5.  [] Date:   6. Julio Alvarez will demonstrate a  >=14 lbs improvement in  strength to complete carrying, lifting, and self care activities. 6.  [] Date:       Rehabilitation Potential For Stated Goals: GOOD    Outcome Measure: Tool Used: Neck Disability Index (NDI)  Score:  Initial: 20/50  Most Recent: X/50 (Date: -- )   Interpretation of Score: The Neck Disability Index is a revised form of the Oswestry Low Back Pain Index and is designed to measure the activities of daily living in person's with neck pain. Each section is scored on a 0-5 scale, 5 representing the greatest disability. The scores of each section are added together for a total score of 50. Tool Used:  Strength  Score:  Initial: 112 lbs left, right 123 lb Most Recent: X lbs (Date: -- )   Interpretation of Score: MDC for  strength is 14 lbs      Medical Necessity:   · Skilled intervention continues to be required due to address above deficits affecting participation in basic ADLs and overall functional tolerance. Reason for Services/Other Comments:  · Patient continues to require skilled intervention due to address above deficits affecting participation in basic ADLs and overall functional tolerance. Total Treatment Duration: 45 min plus treatment  PT Patient Time In/Time Out  Time In: 1605  Time Out: 1700    Regarding J Luis Delia Lorenzoelie's therapy, I certify that the treatment plan above will be carried out by a therapist or under their direction. Thank you for this referral,  Scott Carlos PT     Referring Physician Signature: DELL العلي              Date                    HISTORY:   History of Injury/Illness (Reason for Referral):  Pt had imaging done and found to have cervical stenosis.  Headaches occur 5 days per week that radiate up to base of skull and with occasional radiating pain to top head. pt reports he has continued to do  Exercises from previous bouts of therapy. · Aggravating factors: turning head, repetitive motions   · Relieving factors: massage, stretching, exercises    Dominant Side:         RIGHT    Pain Scale on day of evaluation:  · Current: 7-8/10  · Best: 6/10  · Worst: 8/10    Prior Level of Function/Work/Activity:  Current level of function: headaches 5 days a week, pain relief with stretching,    Work/hobbies: , requires heavy lifting, works out on a regular basis and walks dogs. Other Clinical Tests:          Imaging: YES MRI of c-spine    Previous Treatment Approaches:          Outpatient therapy  Social History/Living Environment:   Pt lives in a one level home alone. Past Medical History/Comorbidities:   Mr. Jitendra Astudillo  has a past medical history of Anxiety, Broken nose, Closed displaced fracture of proximal phalanx of left little finger, Fracture of phalanx of right index finger, and H/O renal calculi (2002). Mr. Jitendra Astudillo  has a past surgical history that includes hx lithotripsy (2002). Ambulatory/Rehab Services H2 Model Falls Risk Assessment    Risk Factors:       (1)  Gender [Male] Ability to Rise from Chair:       (0)  Ability to rise in a single movement    Falls Prevention Plan:       No modifications necessary   Total: (5 or greater = High Risk): 1    ©2010 [facility] [employer]. All Rights Reserved. [facility] Patent #[id number].  Federal Law prohibits the replication, distribution or use without written permission from [facility] [employer]     Current Medications:       Current Outpatient Medications:     methylPREDNISolone (MEDROL DOSEPACK) 4 mg tablet, Take as directed, Disp: 1 Dose Pack, Rfl: 0    diclofenac EC (VOLTAREN) 75 mg EC tablet, Take 1 Tab by mouth two (2) times a day., Disp: 60 Tab, Rfl: 0    baclofen (LIORESAL) 20 mg tablet, Take 1 Tab by mouth nightly., Disp: 30 Tab, Rfl: 0    pravastatin (PRAVACHOL) 20 mg tablet, Take 1 Tab by mouth nightly., Disp: 90 Tab, Rfl: 1    ezetimibe (ZETIA) 10 mg tablet, Take 1 Tab by mouth daily. , Disp: 90 Tab, Rfl: 1    sertraline (ZOLOFT) 50 mg tablet, Take 1 Tab by mouth daily. , Disp: 90 Tab, Rfl: 1   Date Last Reviewed:  8/12/2020        0: LOW COMPLEXITY   EXAMINATION:   Observation/Orthostatic Postural Assessment:          Rounded shoulders forward head  Palpation:          Tenderness in cervical paraspinal, levator scapulae, upper trap, middle trap, and rhomboids pt is guarded.   ROM:      AROM/PROM                  Joint: Eval Date: 8/12/20  Re-Assess Date:  Re-Assess Date:    Active ROM         Cervical Extension 42        Cervical Flexion 52         RIGHT LEFT RIGHT LEFT RIGHT LEFT   Cervical Sidebending 32 42       Cervical Rotation 53 43                Shoulder Flexion No restriction No restriction       Shoulder Abduction No restriction No restriction       Shoulder External Rotation No restriction No restriction       Thoracic Flexion No restriction No restriction       Thoracic Extension neutral neutral             Strength:     Eval Date: 8/12/20  Re-Assess Date:  Re-Assess Date:      RIGHT LEFT RIGHT LEFT RIGHT LEFT   Shoulder Flexion 5/5 5/5       Shoulder Abduction (C5) 4+/5 4+/5       Shoulder Internal Rotation 5/5 5/5       Shoulder External Rotation 4/5 4/5       Elbow Flexion (C6) 5/5 5/5       Elbow Extension (C7) 5/5 5/5       Wrist Flexion (C7) 5/5 5/5       Wrist Extension (C6) 5/5 5/5       Resisted Thumb Extension/Finger Abduction (C8/T1) Normal     Normal        Strength 123 lb 112 lb                Strength:  0-5 scale, 0 no muscle contraction; 1 no joint motion but contraction felt; 2 -less than full ROM in gravity eliminated; 2 full ROM in grav eliminated; 2+ full ROM in grav eliminated and lance to withstand minimal resist; 3-less than full ROM against gravity; 3 full ROM against gravity;  3+ full ROM against gravity and able to withstand minimal resist; 4- full ROM against gravity and able to withstand less than mod resist; 4 full ROM against gravity and able to withstand mod resist; 5 full ROM against gravity and able to withstand max resist.     Special Tests:              Spurling's Test: no radicular symptoms created, pt reports increased tension             Sharp Pursar (instability of the atlanto-axial joint): NT             ULNTT: /Negative    *Denies dysarthria, diplopia, drop attacks, dizziness, dysphagia      Neurological Screen: Radiating symptoms? None at this time     Body Structures Involved:  1. Nerves  2. Joints  3. Muscles  4. Ligaments Body Functions Affected:  1. Sensory/Pain  2. Neuromusculoskeletal  3. Movement Related Activities and Participation Affected:  1. Mobility  2.  Self Care   Number of elements that affect the Plan of Care: LOW COMPLEXITY   CLINICAL PRESENTATION:   Presentation: Stable and uncomplicated: LOW COMPLEXITY   CLINICAL DECISION MAKING:      Use of outcome tool(s) and clinical judgement create a POC that gives a: Clear prediction of patient's progress: LOW COMPLEXITY

## 2020-08-12 NOTE — PROGRESS NOTES
Rocky Guzmán  : 1980  Payor: Adelina Mtz / Plan: [place] [facility] / Product Type: O /  [address] [facility] at [address] [place] [address]. [facility], [address], [facility], [address] [place] [address]  [phone]   [phone]                           OUTPATIENT PHYSICAL THERAPY: Daily Treatment Note 2020 Visit Count:  1    Treatment Diagnosis: Cervicalgia (M54.2)  Headache (R51)  Muscle Weakness (generalized) (M62.81)  Pain in thoracic spine (M54.6)  Precautions/Allergies:   Patient has no known allergies. MD Orders: Eval and Treat MEDICAL/REFERRING DIAGNOSIS:  Cervical disc disorder with radiculopathy, unspecified cervical region [M50.10]  Other cervical disc degeneration, unspecified cervical region [M50.30]  Radiculopathy, cervical region [M54.12]   DATE OF ONSET: Chronic  REFERRING PHYSICIAN: Lori Thomas PA  RETURN PHYSICIAN APPOINTMENT:  TBD by patient     Pre-treatment Symptoms/Complaints:  See Initial Eval Dated 20 for more details. Pain: Initial:710 Post Session: 7/10   Medications Last Reviewed:  2020     Updated Objective Findings: See Initial Eval for more details. TREATMENT:   THERAPEUTIC EXERCISE: (8 minutes):  Exercises per grid below to improve mobility, strength and balance. Required minimal visual, verbal and manual cues to promote proper body alignment and promote proper body posture. Progressed resistance and complexity of movement as indicated. Date:  2020 Date:   Date:     Activity/Exercise Parameters Parameters Parameters   Education HEP, POC, PT goals, anatomy/pathology related to MRI and symptoms and pt gains made in strength from session in  to present day. Use of traction and dry needling and implications     Chair Thoracic extension Mob 2 min                                         THERAPEUTIC ACTIVITY: ( 0 minutes):  Activities per gid below to improve functional movement related mobility, strength and balance to improve neuro-muscular carryover to daily functional activities for improving patient's quality of life. Required visual, verbal and manual cues to promote proper body alignment and promote proper body posture/mechanics. Progressed resistance and complexity of movement as indicated. Date:  8/13/2020 Date:   Date:     Activity/Exercise Parameters Parameters Parameters                                                                               MANUAL THERAPY: (2 minutes): Joint mobilization, Soft tissue mobilization was utilized and necessary because of the patient's restricted joint motion and restricted motion of soft tissue mobility. Date  8/12/2020    Technique Used Grade  Level # Time(s) Effect while being performed   CT junction manipulation V CT junction 2 min Improve mobility, relieve pain                                                          HEP Log Date 1.    8/13/2020   2.  8/13/2020   3. 8/13/2020   4.    5.           [employer] Portal  Treatment/Session Summary:    Response to Treatment: Pt demonstrated understanding of POC and initial HEP. No increase in pain or adverse reactions. Communication/Consultation:  POC, HEP, PT goals, Faxed initial evaluation to MD.   Equipment provided today: HEP Handout     Recommendations/Intent for next treatment session:   Next visit will focus on Manual Therapy Pain Science Education Dry Needling Traction RTC strengthening soft tissue mobilization thoracic mobility and strengthening. Treatment Plan of Care Effective Dates: 8/12/2020 TO 10/11/2020 (60 days).   Frequency/Duration: 2 times a week for 60 Days       Total Treatment Billable Duration:   10 Rx plus Eval   PT Patient Time In/Time Out  Time In: 7050  Time Out: [place], PT    Future Appointments   Date Time Provider Oneyda Peter   8/24/2020  3:15 PM [facility]   8/31/2020  4:00 PM Minh Chin PT [facility]   9/2/2020  4:00 PM Helane Ape, PT [facility]   9/9/2020  4:00 PM Helane Ape, PT [facility]   9/14/2020  4:00 PM Helane Ape, PT [facility]   9/16/2020  4:00 PM Helane Ape, PT [facility]   9/23/2020  4:00 PM Helane Ape, PT [facility]   9/28/2020  4:00 PM Helane Ape, PT [facility]   9/30/2020  4:00 PM Helane Ape, PT [facility]   11/30/2020  7:30 AM Airam Pena MD [facility]

## 2020-08-24 ENCOUNTER — HOSPITAL ENCOUNTER (OUTPATIENT)
Dept: PHYSICAL THERAPY | Age: 40
Discharge: HOME | End: 2020-08-24
Payer: COMMERCIAL

## 2020-08-24 PROCEDURE — 97140 MANUAL THERAPY 1/> REGIONS: CPT

## 2020-08-24 PROCEDURE — 97012 MECHANICAL TRACTION THERAPY: CPT

## 2020-08-24 NOTE — PROGRESS NOTES
Minoo Corrales  : 1980  Payor: Nataliia Solano / Plan: [facility] / Product Type: O /  [address] [facility] at [address] [place] [address]. [facility], [address], [facility], [address] [place] [address]  [phone]   [phone]                           OUTPATIENT PHYSICAL THERAPY: Daily Treatment Note 2020 Visit Count:  2    Treatment Diagnosis: Cervicalgia (M54.2)  Headache (R51)  Muscle Weakness (generalized) (M62.81)  Pain in thoracic spine (M54.6)  Precautions/Allergies:   Patient has no known allergies. MD Orders: Eval and Treat MEDICAL/REFERRING DIAGNOSIS:  Cervical disc disorder with radiculopathy, unspecified cervical region [M50.10]  Other cervical disc degeneration, unspecified cervical region [M50.30]  Radiculopathy, cervical region [M54.12]   DATE OF ONSET: Chronic  REFERRING PHYSICIAN: Lori Thomas PA  RETURN PHYSICIAN APPOINTMENT:  TBD by patient     Pre-treatment Symptoms/Complaints: \"Im real stiff from sleeping wrong last night and stiff after a long trip to bury my grandpa\"  Pain: Initial:7/10 Post Session: 7/10   Medications Last Reviewed:  2020     Updated Objective Findings: See Initial Eval for more details. TREATMENT:   THERAPEUTIC EXERCISE: (8 minutes):  Exercises per grid below to improve mobility, strength and balance. Required minimal visual, verbal and manual cues to promote proper body alignment and promote proper body posture. Progressed resistance and complexity of movement as indicated. Date:  2020 Date:  20 Date:     Activity/Exercise Parameters Parameters Parameters   Education HEP, POC, PT goals, anatomy/pathology related to MRI and symptoms and pt gains made in strength from session in  to present day. Use of traction and dry needling and implications     Chair Thoracic extension Mob 2 min     UBE  3F/3B    shrugs  4# 3x10                            THERAPEUTIC ACTIVITY: ( 0 minutes):  Activities per gid below to improve functional movement related mobility, strength and balance to improve neuro-muscular carryover to daily functional activities for improving patient's quality of life. Required visual, verbal and manual cues to promote proper body alignment and promote proper body posture/mechanics. Progressed resistance and complexity of movement as indicated. Date:  8/24/2020 Date:   Date:     Activity/Exercise Parameters Parameters Parameters                                                                               MANUAL THERAPY: (23 minutes): Joint mobilization, Soft tissue mobilization was utilized and necessary because of the patient's restricted joint motion and restricted motion of soft tissue mobility. Date  8/12/2020    Technique Used Grade  Level # Time(s) Effect while being performed   CT junction manipulation V CT junction     Trigger point dry needling, palpation and education  R UT 25 Improved mobility and relieved symptoms of pain                                        Dry Needles Used: 3   Dry Needles Removed: 3     MODALITIES: (15 minutes):      mechanical cervical traction   22# max/8# min intermittent pulling                HEP Log Date 1.    8/24/2020   2.  8/24/2020   3. 8/24/2020   4.    5.           [employer] Portal  Treatment/Session Summary:    Response to Treatment: Pt responded very well to trigger point dry needling with decresae in symptoms of pain and headache following needling. He also responded well to mechanical cervical traciton with no adverse symtpoms during or after. Communication/Consultation:  POC, HEP, PT goals, Faxed initial evaluation to MD.   Equipment provided today: HEP Handout     Recommendations/Intent for next treatment session:   Next visit will focus on Manual Therapy Pain Science Education Dry Needling Traction RTC strengthening soft tissue mobilization thoracic mobility and strengthening.            Treatment Plan of Care Effective Dates: 8/12/2020 TO 10/11/2020 (60 days).   Frequency/Duration: 2 times a week for 60 Days       Total Treatment Billable Duration:   45 mins  PT Patient Time In/Time Out  Time In: 1515  Time Out: [facility] Appointments   Date Time Provider Oneyda Peter   8/31/2020  4:00 PM Lena Dupes, PT [facility]   9/2/2020  4:00 PM Lena Dupes, PT [facility]   9/9/2020  4:00 PM Lena Dupes, PT [facility]   9/14/2020  4:00 PM Lena Dupes, PT [facility]   9/16/2020  4:00 PM Lena Dupes, PT [facility]   9/23/2020  4:00 PM Lena Dupes, PT [facility]   9/28/2020  4:00 PM Lena Dupes, PT [facility]   9/30/2020  4:00 PM Lena Dupes, PT [facility]   11/30/2020  7:30 AM Sukumar Del Valle MD [facility]

## 2020-08-31 ENCOUNTER — HOSPITAL ENCOUNTER (OUTPATIENT)
Dept: PHYSICAL THERAPY | Age: 40
Discharge: HOME | End: 2020-08-31
Payer: COMMERCIAL

## 2020-08-31 PROCEDURE — 97110 THERAPEUTIC EXERCISES: CPT

## 2020-08-31 PROCEDURE — 97012 MECHANICAL TRACTION THERAPY: CPT

## 2020-08-31 PROCEDURE — 97530 THERAPEUTIC ACTIVITIES: CPT

## 2020-08-31 NOTE — PROGRESS NOTES
Patricia Stanton  : 1980  Payor: Alfreda Chowdhury / Plan: [place] [facility] / Product Type: PPO /  Ed Mason at [address] [facility], [address], [facility], [id number] [place] [address]  [phone]   [phone]                           OUTPATIENT PHYSICAL THERAPY: Daily Treatment Note 2020 Visit Count:  3    Treatment Diagnosis: Cervicalgia (M54.2)  Headache (R51)  Muscle Weakness (generalized) (M62.81)  Pain in thoracic spine (M54.6)  Precautions/Allergies:   Patient has no known allergies. MD Orders: Eval and Treat MEDICAL/REFERRING DIAGNOSIS:  Cervical disc disorder with radiculopathy, unspecified cervical region [M50.10]  Other cervical disc degeneration, unspecified cervical region [M50.30]  Radiculopathy, cervical region [M54.12]   DATE OF ONSET: Chronic  REFERRING PHYSICIAN: Lori Thomas PA  RETURN PHYSICIAN APPOINTMENT:  TBD by patient     Pre-treatment Symptoms/Complaints:  \" I did great with the needling until Saturday when I did some yardwork and it really irritated it again. I had a lot of pain down my arm. \"  Pain: Initial:does not rate/10 Post Session: does not rate/10   Medications Last Reviewed:  2020     Updated Objective Findings: None today. TREATMENT:   THERAPEUTIC EXERCISE: ( 30 minutes):  Exercises per grid below to improve mobility, strength and balance. Required minimal visual, verbal and manual cues to promote proper body alignment and promote proper body posture. Progressed resistance and complexity of movement as indicated. Date:  2020 Date:  20 Date:  20   Activity/Exercise Parameters Parameters Parameters   Education HEP, POC, PT goals, anatomy/pathology related to MRI and symptoms and pt gains made in strength from session in  to present day.   Use of traction and dry needling and implications     Chair Thoracic extension Mob 2 min  Foam Roller with OH reach  5 min   UBE  3F/3B 3F/3B   shrugs  4# 3x10 Isometric Cervical retraction   5 x 15 sec  Progressed to [facility] reach with dowel  5 x 20 sec   Long Sitting 90/90 ER   Black band  2 x 10 reps   minisquat with dowel   2 x 10 reps         THERAPEUTIC ACTIVITY: ( 10 minutes): Activities per gid below to improve functional movement related mobility, strength and balance to improve neuro-muscular carryover to daily functional activities for improving patient's quality of life. Required visual, verbal and manual cues to promote proper body alignment and promote proper body posture/mechanics. Progressed resistance and complexity of movement as indicated. Date:  8/31/2020 Date:   Date:     Activity/Exercise Parameters Parameters Parameters   Deadlift 70lbs  3 x 5 reps                                                                         MANUAL THERAPY: (0 minutes): Joint mobilization, Soft tissue mobilization was utilized and necessary because of the patient's restricted joint motion and restricted motion of soft tissue mobility. Date  8/12/2020    Technique Used Grade  Level # Time(s) Effect while being performed   CT junction manipulation V CT junction     Trigger point dry needling, palpation and education  R UT 25 Improved mobility and relieved symptoms of pain                                        Dry Needles Used: 3   Dry Needles Removed: 3     MODALITIES: (15 minutes):      mechanical cervical traction   Static hold at 22 lbs, with 3 steps up and down for total of 15 min in order to relieve pain and muscle spasms. Therapist monitor patient for neurological changes or adverse reactions. HEP Log Date 1. 8/31/2020   2.  8/31/2020   3. 8/31/2020   4.    5.           [employer] Portal  Treatment/Session Summary:    Response to Treatment: Pt with poor cervical stability in cervical stabilizers that could be contributing to overuse of upper trap muscles.  Pt is able to self mobilize safely with foam roll and to continue to do as needed at home. Pt requires increased instruction for dead lift technique to prevent cervical extension at upper levels. Communication/Consultation:  None today   Equipment provided today: None today     Recommendations/Intent for next treatment session:   Next visit will focus on Manual Therapy Pain Science Education Dry Needling Traction RTC strengthening soft tissue mobilization thoracic mobility and strengthening. Treatment Plan of Care Effective Dates: 8/12/2020 TO 10/11/2020 (60 days).   Frequency/Duration: 2 times a week for 60 Days       Total Treatment Billable Duration:   55 mins total  PT Patient Time In/Time Out  Time In: 1600  Time Out: [place], PT    Future Appointments   Date Time Provider Oneyda Peter   9/2/2020  4:00 PM Erickarvin Harris, PT [facility]   9/9/2020  4:00 PM [facility]ocus[facility]   9/14/2020  4:00 PM Erick Locus, PT [facility]   9/16/2020  4:00 PM Erick Locus, PT [facility]   9/23/2020  4:00 PM Erick Locus, PT [facility]   9/28/2020  4:00 PM Erick Locus, PT [facility]   9/30/2020  4:00 PM Erick Locus, PT [facility]   11/30/2020  7:30 AM Gloria Ruano MD [facility]

## 2020-09-02 ENCOUNTER — HOSPITAL ENCOUNTER (OUTPATIENT)
Dept: PHYSICAL THERAPY | Age: 40
Discharge: HOME | End: 2020-09-02
Payer: COMMERCIAL

## 2020-09-02 PROCEDURE — 97110 THERAPEUTIC EXERCISES: CPT

## 2020-09-02 PROCEDURE — 97012 MECHANICAL TRACTION THERAPY: CPT

## 2020-09-02 PROCEDURE — 97530 THERAPEUTIC ACTIVITIES: CPT

## 2020-09-02 NOTE — PROGRESS NOTES
Gretchen Sargent  : 1980  Payor: Saturnino Longoria / [place]: [place] [facility] / Product Type: PPO /  [address] at [address] [facility], [address], [facility], [address] [place] [address]  [phone]   [phone]                           OUTPATIENT PHYSICAL THERAPY: Daily Treatment Note 2020 Visit Count:  4    Treatment Diagnosis: Cervicalgia (M54.2)  Headache (R51)  Muscle Weakness (generalized) (M62.81)  Pain in thoracic spine (M54.6)  Precautions/Allergies:   Patient has no known allergies. MD Orders: Eval and Treat MEDICAL/REFERRING DIAGNOSIS:  Cervical disc disorder with radiculopathy, unspecified cervical region [M50.10]  Other cervical disc degeneration, unspecified cervical region [M50.30]  Radiculopathy, cervical region [M54.12]   DATE OF ONSET: Chronic  REFERRING PHYSICIAN: Lori Thomas PA  RETURN PHYSICIAN APPOINTMENT:  TBD by patient     Pre-treatment Symptoms/Complaints:  \"I am actually feeling pretty good today. \"  Pain: Initial:does not rate/10 Post Session: does not rate/10   Medications Last Reviewed:  2020     Updated Objective Findings: None today. TREATMENT:   THERAPEUTIC EXERCISE: ( 30 minutes):  Exercises per grid below to improve mobility, strength and balance. Required minimal visual, verbal and manual cues to promote proper body alignment and promote proper body posture. Progressed resistance and complexity of movement as indicated. Date:  2020 Date:  20 Date:  20 Date:  20   Activity/Exercise Parameters Parameters Parameters    Education HEP, POC, PT goals, anatomy/pathology related to MRI and symptoms and pt gains made in strength from session in  to present day.   Use of traction and dry needling and implications      Chair Thoracic extension Mob 2 min  Foam Roller with OH reach  5 min    UBE  3F/3B 3F/3B 3F/3B   shrugs  4# 3x10  10 lb  3 x 10 reps   Isometric Cervical retraction   5 x 15 sec  Progressed to [facility] reach with dowel  5 x 20 sec OH reach with dowel  5 x 20 sec  1 x 30 sec   Long Sitting 90/90 ER   Black band  2 x 10 reps Standing  Blue band  3 x 10 reps   minisquat with dowel   2 x 10 reps    ER with OH reach    Purple band  3 x 5 reps   planks    3 x 45 sec   Isometric Cervical rotation    Green band  3 x 30 sec         THERAPEUTIC ACTIVITY: ( 15 minutes): Activities per gid below to improve functional movement related mobility, strength and balance to improve neuro-muscular carryover to daily functional activities for improving patient's quality of life. Required visual, verbal and manual cues to promote proper body alignment and promote proper body posture/mechanics. Progressed resistance and complexity of movement as indicated. Date:  8/31/2020 Date:  9/2/20 Date:     Activity/Exercise Parameters Parameters Parameters   Deadlift 70lbs  3 x 5 reps       [place] Get up   10 lb  Left/right  2 x 10 reps                                                             MANUAL THERAPY: (0 minutes): Joint mobilization, Soft tissue mobilization was utilized and necessary because of the patient's restricted joint motion and restricted motion of soft tissue mobility. Date  8/12/2020    Technique Used Grade  Level # Time(s) Effect while being performed   CT junction manipulation V CT junction     Trigger point dry needling, palpation and education  R UT 25 Improved mobility and relieved symptoms of pain                                        Dry Needles Used: 3   Dry Needles Removed: 3     MODALITIES: (15 minutes):      mechanical cervical traction   Static hold at 22 lbs, with 3 steps up and down for total of 15 min in order to relieve pain and muscle spasms. Therapist monitor patient for neurological changes or adverse reactions.                HEP Log Date 1. 9/2/2020   2. 9/2/2020   3. 9/2/2020   4.    5.           [employer] Portal  Treatment/Session Summary:    Response to Treatment: Pt with decreased scapular stability and abdominal strength noted with [place] get up as requires cues for UE positioning and noted LE lifting off floor during movement pattern. Pt with improved deep cervical stabilizer recruitment as noted by increased time with chin tuck off mat for longer time and no compensations noted. Communication/Consultation:  None today   Equipment provided today: None today     Recommendations/Intent for next treatment session:   Next visit will focus on Manual Therapy Pain Science Education Dry Needling Traction RTC strengthening soft tissue mobilization thoracic mobility and strengthening. Treatment Plan of Care Effective Dates: 8/12/2020 TO 10/11/2020 (60 days).   Frequency/Duration: 2 times a week for 60 Days       Total Treatment Billable Duration:   60 mins total  PT Patient Time In/Time Out  Time In: 1600  Time Out: [place], PT    Future Appointments   Date Time Provider Oneyda Peter   9/9/2020  4:00 PM Noreen Lombardo, PT [facility]   9/14/2020  4:00 PM Noreen Lombardo, [facility]   9/16/2020  4:00 PM Noreen Lombardo, PT [facility]   9/23/2020  4:00 PM Noreen Lombardo, PT [facility]   9/28/2020  4:00 PM Noreen Lombardo, PT [facility]   9/30/2020  4:00 PM Noreen Lombardo, PT [facility]   11/30/2020  7:30 AM Kranthi Thao MD [facility]

## 2020-09-09 ENCOUNTER — HOSPITAL ENCOUNTER (OUTPATIENT)
Dept: PHYSICAL THERAPY | Age: 40
Discharge: HOME | End: 2020-09-09
Payer: COMMERCIAL

## 2020-09-09 PROCEDURE — 97110 THERAPEUTIC EXERCISES: CPT

## 2020-09-09 PROCEDURE — 97012 MECHANICAL TRACTION THERAPY: CPT

## 2020-09-09 PROCEDURE — 97140 MANUAL THERAPY 1/> REGIONS: CPT

## 2020-09-09 NOTE — PROGRESS NOTES
Miller Shine  : 1980  Payor: Julio C Ramirez / Plan: [facility] / Product Type: PPO /  Stella Diesel at [address] [facility], [address], [facility], [address] [place] [address]  [phone]   [phone]                           OUTPATIENT PHYSICAL THERAPY: Daily Treatment Note 2020 Visit Count:  5    Treatment Diagnosis: Cervicalgia (M54.2)  Headache (R51)  Muscle Weakness (generalized) (M62.81)  Pain in thoracic spine (M54.6)  Precautions/Allergies:   Patient has no known allergies. MD Orders: Eval and Treat MEDICAL/REFERRING DIAGNOSIS:  Cervical disc disorder with radiculopathy, unspecified cervical region [M50.10]  Other cervical disc degeneration, unspecified cervical region [M50.30]  Radiculopathy, cervical region [M54.12]   DATE OF ONSET: Chronic  REFERRING PHYSICIAN: Lori Thomas PA  RETURN PHYSICIAN APPOINTMENT:  TBD by patient     Pre-treatment Symptoms/Complaints:  \"I am pretty sore today. I have had a lot of soreness since Friday. \"  Pain: Initial:does not rate/10 Post Session: does not rate/10   Medications Last Reviewed:  2020     Updated Objective Findings: None today. TREATMENT:   THERAPEUTIC EXERCISE: ( 25 minutes):  Exercises per grid below to improve mobility, strength and balance. Required minimal visual, verbal and manual cues to promote proper body alignment and promote proper body posture. Progressed resistance and complexity of movement as indicated. Date:  2020 Date:  20 Date:  20 Date:  20 Date:  20   Activity/Exercise Parameters Parameters Parameters     Education HEP, POC, PT goals, anatomy/pathology related to MRI and symptoms and pt gains made in strength from session in  to present day.   Use of traction and dry needling and implications       Chair Thoracic extension Mob 2 min  Foam Roller with OH reach  5 min  Foam Roller with OH reach  5 min  Chair mobilization  3 min   UBE  3F/3B 3F/3B 3F/3B 3F/3B   shrugs  4# 3x10  10 lb  3 x 10 reps    Isometric Cervical retraction   5 x 15 sec  Progressed to [facility] reach with dowel  5 x 20 sec OH reach with dowel  5 x 20 sec  1 x 30 sec    Long Sitting 90/90 ER   Black band  2 x 10 reps Standing  Blue band  3 x 10 reps    minisquat with dowel   2 x 10 reps     ER with OH reach    Purple band  3 x 5 reps    planks    3 x 45 sec    Isometric Cervical rotation    Green band  3 x 30 sec    Rows     27 lb  2 x 15 reps   Self cervical Traction on chair     Red band  5 min       THERAPEUTIC ACTIVITY: ( 0 minutes): Activities per gid below to improve functional movement related mobility, strength and balance to improve neuro-muscular carryover to daily functional activities for improving patient's quality of life. Required visual, verbal and manual cues to promote proper body alignment and promote proper body posture/mechanics. Progressed resistance and complexity of movement as indicated. Date:  8/31/2020 Date:  9/2/20 Date:     Activity/Exercise Parameters Parameters Parameters   Deadlift 70lbs  3 x 5 reps       [place] Get up   10 lb  Left/right  2 x 10 reps                                                             MANUAL THERAPY: (10 minutes): Joint mobilization, Soft tissue mobilization was utilized and necessary because of the patient's restricted joint motion and restricted motion of soft tissue mobility. Date  9/9/2020    Technique Used Grade  Level # Time(s) Effect while being performed   CT junction manipulation V CT junction 3 min Improved mobility and relieved symptoms of pain   CT rotation mobilization IV C7-T1 7 min Improved mobility and relieved symptoms of pain                                          MODALITIES: (20 minutes):      mechanical cervical traction   Static hold at 22 lbs, with 3 steps up and down for total of 20 min in order to relieve pain and muscle spasms.  Therapist monitor patient for neurological changes or adverse reactions. HEP Log Date 1.    9/9/2020   2.  9/9/2020   3. 9/9/2020   4.    5.           [employer] Portal  Treatment/Session Summary:    Response to Treatment: Pt responds positively to manual therapy technique with improved cervical rotation comparable to other side with no end range pain or headache reports. Interventions focus on holding positions to promote stability v. Lifting overhead. Communication/Consultation:  None today   Equipment provided today: None today     Recommendations/Intent for next treatment session:   Next visit will focus on Manual Therapy Pain Science Education Dry Needling Traction RTC strengthening soft tissue mobilization thoracic mobility and strengthening. Treatment Plan of Care Effective Dates: 8/12/2020 TO 10/11/2020 (60 days).   Frequency/Duration: 2 times a week for 60 Days       Total Treatment Billable Duration:   55 mins total  PT Patient Time In/Time Out  Time In: 1600  Time Out: 100 Vladislav Barraza PT    Future Appointments   Date Time Provider Oneyda Peter   9/14/2020  4:00 PM Milan Relic, PT [facility]   9/16/2020  4:00 PM Consueloetta Relic, PT [facility]   9/23/2020  4:00 PM Consueloetta Relic, PT [facility]   9/28/2020  4:00 PM Consueloetta Relic, PT [facility]   9/30/2020  4:00 PM Consueloetta Relic, PT [facility]   11/30/2020  7:30 AM Yamil Lozano MD [facility]

## 2020-09-14 ENCOUNTER — HOSPITAL ENCOUNTER (OUTPATIENT)
Dept: PHYSICAL THERAPY | Age: 40
Discharge: HOME | End: 2020-09-14
Payer: COMMERCIAL

## 2020-09-14 PROCEDURE — 97110 THERAPEUTIC EXERCISES: CPT

## 2020-09-14 PROCEDURE — 97012 MECHANICAL TRACTION THERAPY: CPT

## 2020-09-14 PROCEDURE — 97530 THERAPEUTIC ACTIVITIES: CPT

## 2020-09-14 PROCEDURE — 97140 MANUAL THERAPY 1/> REGIONS: CPT

## 2020-09-14 NOTE — PROGRESS NOTES
Reji Dire  : 1980  Payor: Ebenezer Cobb / [place]: [place] [facility] / Product Type: [facility] /  [address] [facility] at [address] [place] [address]. [facility], [address], [facility], [address] [place] [address]  [phone]   [phone]                           OUTPATIENT PHYSICAL THERAPY: Daily Treatment Note 2020 Visit Count:  6    Treatment Diagnosis: Cervicalgia (M54.2)  Headache (R51)  Muscle Weakness (generalized) (M62.81)  Pain in thoracic spine (M54.6)  Precautions/Allergies:   Patient has no known allergies. MD Orders: Eval and Treat MEDICAL/REFERRING DIAGNOSIS:  Cervical disc disorder with radiculopathy, unspecified cervical region [M50.10]  Other cervical disc degeneration, unspecified cervical region [M50.30]  Radiculopathy, cervical region [M54.12]   DATE OF ONSET: Chronic  REFERRING PHYSICIAN: Lori Thomas PA  RETURN PHYSICIAN APPOINTMENT:  TBD by patient     Pre-treatment Symptoms/Complaints:  \"I am less sore today then I was the time. I tried the strap on the chair to do home traction, but it did not do as well and in here. \"  Pain: Initial:does not rate/10 Post Session: does not rate/10   Medications Last Reviewed:  2020     Updated Objective Findings: None today. TREATMENT:   THERAPEUTIC EXERCISE: ( 22 minutes):  Exercises per grid below to improve mobility, strength and balance. Required minimal visual, verbal and manual cues to promote proper body alignment and promote proper body posture. Progressed resistance and complexity of movement as indicated. Date:  2020 Date:  20 Date:  20 Date:  20 Date:  20 Date:  20   Activity/Exercise Parameters Parameters Parameters      Education HEP, POC, PT goals, anatomy/pathology related to MRI and symptoms and pt gains made in strength from session in  to present day.   Use of traction and dry needling and implications        Chair Thoracic extension Mob 2 min  Foam Roller with OH reach  5 min  Foam Roller with OH reach  5 min  Chair mobilization  3 min Foam Roller with OH reach  5 min  Prone with towel 20 sec   UBE  3F/3B 3F/3B 3F/3B 3F/3B 3F/3B   shrugs  4# 3x10  10 lb  3 x 10 reps     Isometric Cervical retraction   5 x 15 sec  Progressed to [facility] reach with dowel  5 x 20 sec OH reach with dowel  5 x 20 sec  1 x 30 sec  OH reach with dowel, supine  4 x 20 sec   Long Sitting 90/90 ER   Black band  2 x 10 reps Standing  Blue band  3 x 10 reps     minisquat with dowel   2 x 10 reps      ER with OH reach    Purple band  3 x 5 reps     planks    3 x 45 sec     Isometric Cervical rotation    Green band  3 x 30 sec     Rows     27 lb  2 x 15 reps    Self cervical Traction on chair     Red band  5 min    Ys with cervical retraction      Prone  3 x 30 sec   Wall [place]      2 x 10 reps  Yoga block behind head                THERAPEUTIC ACTIVITY: ( 8 minutes): Activities per gid below to improve functional movement related mobility, strength and balance to improve neuro-muscular carryover to daily functional activities for improving patient's quality of life. Required visual, verbal and manual cues to promote proper body alignment and promote proper body posture/mechanics. Progressed resistance and complexity of movement as indicated. Date:  8/31/2020 Date:  9/2/20 Date:  9/1420   Activity/Exercise Parameters Parameters Parameters   Deadlift 70lbs  3 x 5 reps       [place] Get up   10 lb  Left/right  2 x 10 reps     Quadruped Thoracic rotation with flexion and extension     Left/right  10 x each                                       MANUAL THERAPY: (10 minutes): Joint mobilization, Soft tissue mobilization was utilized and necessary because of the patient's restricted joint motion and restricted motion of soft tissue mobility.         Date  9/14/2020    Technique Used Grade  Level # Time(s) Effect while being performed   CT Junction Manioulation V CT junction 0 min Improved mobility and relieved symptoms of pain   Trigger point Dry needling and palpation  Left upper trap 10 min Relief of muscle spasm, pain, and improve tissue extensibility                                        Dry Needles Used: 1   Dry Needles Removed: 1         MODALITIES: (15 minutes):      mechanical cervical traction   Static hold at 22 lbs, with 3 steps up and down for total of 15 min in order to relieve pain and muscle spasms. Therapist monitor patient for neurological changes or adverse reactions. HEP Log Date 1.    9/14/2020   2.  9/14/2020   3. 9/14/2020   4.    5.           [employer] Portal  Treatment/Session Summary:    Response to Treatment: Pt responds positively to dry needling with noted reduction in spasm and reports complete relief of pain. Pt with lack of thoracic extension mobility requiring modification of wall angels. Pt to no longer continue with prone thoracic extension mob as benefits more from use of foam roller. Communication/Consultation:  None today   Equipment provided today: None today     Recommendations/Intent for next treatment session:   Next visit will focus on Manual Therapy Pain Science Education Dry Needling Traction RTC strengthening soft tissue mobilization thoracic mobility and strengthening. Treatment Plan of Care Effective Dates: 8/12/2020 TO 10/11/2020 (60 days).   Frequency/Duration: 2 times a week for 60 Days       Total Treatment Billable Duration:   55 mins total  PT Patient Time In/Time Out  Time In: 9562  Time Out: Doretha Javier 77, PT    Future Appointments   Date Time Provider Oneyda Peter   9/16/2020  4:00 PM Yenny Barbour, PT [facility]   9/23/2020  4:00 PM [facility]   9/28/2020  4:00 PM [facility]   9/30/2020  4:00 PM Yenny Barbour, PT [facility]   11/30/2020  7:30 AM Airam Pena MD [facility]

## 2020-09-16 ENCOUNTER — HOSPITAL ENCOUNTER (OUTPATIENT)
Dept: PHYSICAL THERAPY | Age: 40
Discharge: HOME | End: 2020-09-16
Payer: COMMERCIAL

## 2020-09-16 PROCEDURE — 97140 MANUAL THERAPY 1/> REGIONS: CPT

## 2020-09-16 PROCEDURE — 97110 THERAPEUTIC EXERCISES: CPT

## 2020-09-16 PROCEDURE — 97530 THERAPEUTIC ACTIVITIES: CPT

## 2020-09-16 PROCEDURE — 97012 MECHANICAL TRACTION THERAPY: CPT

## 2020-09-16 NOTE — PROGRESS NOTES
Miller Shine  : 1980  Payor: Julio C Ramirez / Plan: [facility] / Product Type: PPO /  Stella Diesel at [address], [facility], [address] [place] [address]  [phone]   [phone]                           OUTPATIENT PHYSICAL THERAPY: Daily Treatment Note 2020 Visit Count:  7    Treatment Diagnosis: Cervicalgia (M54.2)  Headache (R51)  Muscle Weakness (generalized) (M62.81)  Pain in thoracic spine (M54.6)  Precautions/Allergies:   Patient has no known allergies. MD Orders: Eval and Treat MEDICAL/REFERRING DIAGNOSIS:  Cervical disc disorder with radiculopathy, unspecified cervical region [M50.10]  Other cervical disc degeneration, unspecified cervical region [M50.30]  Radiculopathy, cervical region [M54.12]   DATE OF ONSET: Chronic  REFERRING PHYSICIAN: Lori Thomas PA  RETURN PHYSICIAN APPOINTMENT:  TBD by patient     Pre-treatment Symptoms/Complaints:  \"I am feeling good today. The needling helped. I don't feel as much weakness. The Headaches have improved - they do not last as long and will go completely away. \"  Pain: Initial:does not rate/10 Post Session: does not rate/10   Medications Last Reviewed:  2020     Updated Objective Findings: None today. TREATMENT:   THERAPEUTIC EXERCISE: ( 20 minutes):  Exercises per grid below to improve mobility, strength and balance. Required minimal visual, verbal and manual cues to promote proper body alignment and promote proper body posture. Progressed resistance and complexity of movement as indicated. Date:  2020 Date:  20 Date:  20 Date:  20 Date:  20 Date:  20 Date:  20   Activity/Exercise Parameters Parameters Parameters       Education HEP, POC, PT goals, anatomy/pathology related to MRI and symptoms and pt gains made in strength from session in  to present day.   Use of traction and dry needling and implications         Chair Thoracic extension Mob 2 min  Foam Roller with OH reach  5 min  Foam Roller with OH reach  5 min  Chair mobilization  3 min Foam Roller with OH reach  5 min  Prone with towel 20 sec    UBE  3F/3B 3F/3B 3F/3B 3F/3B 3F/3B 3F/3B  lvl 2.5   shrugs  4# 3x10  10 lb  3 x 10 reps      Isometric Cervical retraction   5 x 15 sec  Progressed to [place] reach with dowel  5 x 20 sec OH reach with dowel  5 x 20 sec  1 x 30 sec  OH reach with dowel, supine  4 x 20 sec    Long Sitting 90/90 ER   Black band  2 x 10 reps Standing  Blue band  3 x 10 reps      minisquat with dowel   2 x 10 reps       ER with OH reach    Purple band  3 x 5 reps      planks    3 x 45 sec      Isometric Cervical rotation    Green band  3 x 30 sec      Rows     27 lb  2 x 15 reps  33 lb  2 x 10 reps   Self cervical Traction on chair     Red band  5 min     Ys with cervical retraction      Prone  3 x 30 sec Prone  2lb  2 x 8 reps   Wall [place]      2 x 10 reps  Yoga block behind head 2 x 10 reps, 2lb  Yoga block behind head   Ts cervical retraction       Prone  2lb  2 x 8 reps   Pull Down       30 lbs  2 x 10 reps       THERAPEUTIC ACTIVITY: ( 10 minutes): Activities per gid below to improve functional movement related mobility, strength and balance to improve neuro-muscular carryover to daily functional activities for improving patient's quality of life. Required visual, verbal and manual cues to promote proper body alignment and promote proper body posture/mechanics. Progressed resistance and complexity of movement as indicated.      Date:  8/31/2020 Date:  9/2/20 Date:  9/1420 Date:  9/16/20   Activity/Exercise Parameters Parameters Parameters    Deadlift 70lbs  3 x 5 reps    65lbs  3 x 5 reps   [place] Get up   10 lb  Left/right  2 x 10 reps       Quadruped Thoracic rotation with flexion and extension     Left/right  10 x each                                              MANUAL THERAPY: (15 minutes): Joint mobilization, Soft tissue mobilization was utilized and necessary because of the patient's restricted joint motion and restricted motion of soft tissue mobility. Date  9/14/2020    Technique Used Grade  Level # Time(s) Effect while being performed   CT Junction Manioulation V CT junction 0 min Improved mobility and relieved symptoms of pain   Trigger point Dry needling and palpation  Left upper trap 15 min Relief of muscle spasm, pain, and improve tissue extensibility                                        Dry Needles Used: 3   Dry Needles Removed: 3         MODALITIES: (15 minutes):      mechanical cervical traction   Static hold at 22 lbs, with 3 steps up and down for total of 15 min in order to relieve pain and muscle spasms. Therapist monitor patient for neurological changes or adverse reactions. Cervical spine at 20 dg of flexion. HEP Log Date 1.    9/16/2020   2.  9/16/2020   3. 9/16/2020   4.    5.           [employer] Portal  Treatment/Session Summary:    Response to Treatment: Pt with positive response to dry needling with reported significant relief in tension and relief of pain and sinus pressure. Pt requires intermittent cues for dead lift technique with LE positioning and coordination, however he maintains good spinal alignment. Communication/Consultation:  None today   Equipment provided today: None today     Recommendations/Intent for next treatment session:   Next visit will focus on Manual Therapy Pain Science Education Dry Needling Traction RTC strengthening soft tissue mobilization thoracic mobility and strengthening, over head press and squat           Treatment Plan of Care Effective Dates: 8/12/2020 TO 10/11/2020 (60 days).   Frequency/Duration: 2 times a week for 60 Days       Total Treatment Billable Duration:   60 mins total  PT Patient Time In/Time Out  Time In: 1550  Time Out: Doretha Javier 77, PT    Future Appointments   Date Time Provider Oneyda Peter   9/23/2020  4:00 PM Ellen Costa PT [facility]   9/28/2020  4:00 PM Horace Rosen, PT [facility]   9/30/2020  4:00 PM Horace Rosen, PT [facility]   11/30/2020  7:30 AM Lisa Randolph MD [facility]

## 2020-09-23 ENCOUNTER — APPOINTMENT (OUTPATIENT)
Dept: PHYSICAL THERAPY | Age: 40
End: 2020-09-23
Payer: COMMERCIAL

## 2020-09-28 ENCOUNTER — HOSPITAL ENCOUNTER (OUTPATIENT)
Dept: PHYSICAL THERAPY | Age: 40
Discharge: HOME | End: 2020-09-28
Payer: COMMERCIAL

## 2020-09-28 PROCEDURE — 97530 THERAPEUTIC ACTIVITIES: CPT

## 2020-09-28 PROCEDURE — 97110 THERAPEUTIC EXERCISES: CPT

## 2020-09-28 PROCEDURE — 97012 MECHANICAL TRACTION THERAPY: CPT

## 2020-09-28 NOTE — PROGRESS NOTES
Abdiel Bell  : 1980  Payor: Jace Vera / Plan: [facility] / Product Type: PPO /  Valerie Slocumb at [address] [facility] [address], [facility], [address] [place] [address]  [phone]   [phone]                           OUTPATIENT PHYSICAL THERAPY: Daily Treatment Note 2020 Visit Count:  8    Treatment Diagnosis: Cervicalgia (M54.2)  Headache (R51)  Muscle Weakness (generalized) (M62.81)  Pain in thoracic spine (M54.6)  Precautions/Allergies:   Patient has no known allergies. MD Orders: Eval and Treat MEDICAL/REFERRING DIAGNOSIS:  Cervical disc disorder with radiculopathy, unspecified cervical region [M50.10]  Other cervical disc degeneration, unspecified cervical region [M50.30]  Radiculopathy, cervical region [M54.12]   DATE OF ONSET: Chronic  REFERRING PHYSICIAN: Lori Thomas PA  RETURN PHYSICIAN APPOINTMENT:  TBD by patient     Pre-treatment Symptoms/Complaints:  \"I am feeling a lot better today. The pain is a lot better than it has been. It is more a a dull ache\"  Pain: Initial:does not rate/10 Post Session: does not rate/10   Medications Last Reviewed:  2020     Updated Objective Findings: None today. TREATMENT:   THERAPEUTIC EXERCISE: ( 17 minutes):  Exercises per grid below to improve mobility, strength and balance. Required minimal visual, verbal and manual cues to promote proper body alignment and promote proper body posture. Progressed resistance and complexity of movement as indicated.      Date:  20 Date:  20 Date:  20 Date:  20 Date:  20   Activity/Exercise        Education        Chair Thoracic extension Mob  Foam Roller with OH reach  5 min  Chair mobilization  3 min Foam Roller with OH reach  5 min  Prone with towel 20 sec  Foam Roller with OH reach  5    UBE 3F/3B 3F/3B 3F/3B 3F/3B  lvl 2.5 3F/3B  lvl 2.5   shrugs 10 lb  3 x 10 reps       Isometric Cervical retraction OH reach with dowel  5 x 20 sec  1 x 30 sec  OH reach with dowel, supine  4 x 20 sec     Long Sitting 90/90 ER Standing  Blue band  3 x 10 reps       ER with OH reach Purple band  3 x 5 reps       planks 3 x 45 sec       Isometric Cervical rotation Green band  3 x 30 sec       Rows  27 lb  2 x 15 reps  33 lb  2 x 10 reps    Self cervical Traction on chair  Red band  5 min      Ys with cervical retraction   Prone  3 x 30 sec Prone  2lb  2 x 8 reps    Wall [place]   2 x 10 reps  Yoga block behind head 2 x 10 reps, 2lb  Yoga block behind head    Ts cervical retraction    Prone  2lb  2 x 8 reps    Pull Down    30 lbs  2 x 10 reps 40 lbs  3 x 10 reps   Body weighted rows     3 x 10 reps       THERAPEUTIC ACTIVITY: ( 23 minutes): Activities per gid below to improve functional movement related mobility, strength and balance to improve neuro-muscular carryover to daily functional activities for improving patient's quality of life. Required visual, verbal and manual cues to promote proper body alignment and promote proper body posture/mechanics. Progressed resistance and complexity of movement as indicated. Date:  8/31/2020 Date:  9/2/20 Date:  9/1420 Date:  9/16/20 Date:  9/28/20   Activity/Exercise Parameters Parameters Parameters     Deadlift 70lbs  3 x 5 reps    65lbs  3 x 5 reps 75 lb  3 x 5 reps   [place] Get up   10 lb  Left/right  2 x 10 reps     Left/Right  10lb  2 x 8 reps     Quadruped Thoracic rotation with flexion and extension     Left/right  10 x each                                                     MANUAL THERAPY: (0 minutes): Joint mobilization, Soft tissue mobilization was utilized and necessary because of the patient's restricted joint motion and restricted motion of soft tissue mobility.         Date  9/14/2020    Technique Used Grade  Level # Time(s) Effect while being performed   CT Junction Manioulation V CT junction 0 min Improved mobility and relieved symptoms of pain   Trigger point Dry needling and palpation  Left upper trap 15 min Relief of muscle spasm, pain, and improve tissue extensibility                                        Dry Needles Used: 3   Dry Needles Removed: 3         MODALITIES: (15 minutes):      mechanical cervical traction   Static hold at 22 lbs, with 3 steps up and down for total of 15 min in order to relieve pain and muscle spasms. Therapist monitor patient for neurological changes or adverse reactions. Cervical spine at 20 dg of flexion. HEP Log Date 1.    9/28/2020   2.  9/28/2020   3. 9/28/2020   4.    5.           [employer] Portal  Treatment/Session Summary:    Response to Treatment: Pt progressed deadlift by 10 lbs today in addition to tricep extensions. Pt with minimal cues to execute appropriate dead lift technique with intermittent extension of c-spine. Communication/Consultation:  None today   Equipment provided today: None today     Recommendations/Intent for next treatment session:   Next visit will focus on Manual Therapy Pain Science Education Dry Needling Traction RTC strengthening soft tissue mobilization thoracic mobility and strengthening, over head press and squat, [place] Brunt carry, and  carry next visit           Treatment Plan of Care Effective Dates: 8/12/2020 TO 10/11/2020 (60 days).   Frequency/Duration: 2 times a week for 60 Days       Total Treatment Billable Duration:   55 mins total  PT Patient Time In/Time Out  Time In: 1600  Time Out: 778 Dewey Archer, PT    Future Appointments   Date Time Provider Oneyda Peter   10/5/2020  4:00 PM Leila [facility]   10/7/2020  4:00 PM Karen Taylor PT [facility]   10/12/2020  4:00 PM Karen Taylor PT [facility]   10/14/2020  4:00 PM Karen Taylor PT [facility]   11/30/2020  7:30 AM Jasiel Dobbs MD [facility]

## 2020-09-30 ENCOUNTER — APPOINTMENT (OUTPATIENT)
Dept: PHYSICAL THERAPY | Age: 40
End: 2020-09-30
Payer: COMMERCIAL

## 2020-10-05 ENCOUNTER — HOSPITAL ENCOUNTER (OUTPATIENT)
Dept: PHYSICAL THERAPY | Age: 40
Discharge: HOME | End: 2020-10-05
Payer: COMMERCIAL

## 2020-10-05 PROCEDURE — 97012 MECHANICAL TRACTION THERAPY: CPT

## 2020-10-05 PROCEDURE — 97110 THERAPEUTIC EXERCISES: CPT

## 2020-10-05 PROCEDURE — 97530 THERAPEUTIC ACTIVITIES: CPT

## 2020-10-07 ENCOUNTER — HOSPITAL ENCOUNTER (OUTPATIENT)
Dept: PHYSICAL THERAPY | Age: 40
Discharge: HOME | End: 2020-10-07
Payer: COMMERCIAL

## 2020-10-07 PROCEDURE — 97530 THERAPEUTIC ACTIVITIES: CPT

## 2020-10-07 PROCEDURE — 97110 THERAPEUTIC EXERCISES: CPT

## 2020-10-07 PROCEDURE — 97012 MECHANICAL TRACTION THERAPY: CPT

## 2020-10-07 NOTE — PROGRESS NOTES
Jamal Reese  : 1980  Primary: [facility] [employer]*  Secondary:  [facility]  [address], [address]  [phone]   [phone]      OUTPATIENT PHYSICAL THERAPY: Recertification Assessment 10/7/2020    Treatment Diagnosis: Cervicalgia (M54.2)  Headache (R51)  Muscle Weakness (generalized) (M62.81)  Pain in thoracic spine (M54.6)  Precautions/Allergies:   Patient has no known allergies. MD Orders: Eval and Treat MEDICAL/REFERRING DIAGNOSIS:  Cervical disc disorder with radiculopathy, unspecified cervical region [M50.10]  Other cervical disc degeneration, unspecified cervical region [M50.30]  Radiculopathy, cervical region [M54.12]   DATE OF ONSET: Chronic  REFERRING PHYSICIAN: Lori Thomas PA  RETURN PHYSICIAN APPOINTMENT:  TBD by patient   Progress Note ASSESSMENT:  Mr. Jamal Reese has met 3/3 STGs and 1/6 LTGs over course of therapy intervention focused on pain management through traction, dry needling, stretching, and pain science education. Pt has responded well to strength training and has a progressive increase in weight tolerance between sessions and is anticipated to meet all goals for dead lifts and overhead press in order to improve ability to complete job related tasks and home wellness program.      PROBLEM LIST (Impacting functional limitations):  1. Decreased Strength  2. Decreased ADL/Functional Activities  3. Decreased Transfer Abilities  4. Increased Pain  5. Decreased Activity Tolerance  6. Increased Fatigue  7. Increased Shortness of Breath  8. Decreased Flexibility/Joint Mobility  9. Decreased [place] with Home Exercise Program INTERVENTIONS PLANNED:  1. Balance Exercise  2. Bed Mobility  3. Cold  4. Cryotherapy  5. Family Education  6. Gait Training  7. Heat  8. Home Exercise Program (HEP)  9. Manual Therapy  10. Neuromuscular Re-education/Strengthening  11.  Range of Motion (ROM)  12. Therapeutic Activites  13. Therapeutic Exercise/Strengthening  14. Dry needling  15. Traction   TREATMENT PLAN:  Effective Dates: 10/7/20 TO 11/13/20 (30 days). Frequency/Duration: 2 times a week for 60 Days    GOALS: (Goals have been discussed and agreed upon with patient.)   Short-Term Goals: 30 days  Goal Met   1. Ainsley aHrmon will improve rotation ROM by  >=10 degree to assist with improved function and less pain with driving. 1.  [x] Date: 10/7/20   2. Ainsley Harmon will improve extension ROM by  >= 5 degree to assist with improved function and less pain with looking up. 2.  [x] Date: 10/7/20   3. Ainsley Harmon will be able to carry 50 lbs over >=150ft to carry objects for ADLs and IADLs. 3.  [x] Date: 10/7/20         Long Term Goals: 60 days Goal Met   1. Ainsley Harmon  will be independent with HEP for cervical region and UE's to sustain functional gains and pain management techniques made in therapy. 1.  [] Date:Progressing   2. Ainsley Harmon will demonstrate improved NDI score by greater than or equal to 7 points indicating improved cervical mobility and strengthen for improved participation in ADLs and IADLS. 2.  [x] Date: 10/7/20   3. Ainsley Harmon will demonstrate lifting overhead  with 30 lbs in order to place items in overhead cabinet. 3.  [] Date: Progressing   4. Ainsley Harmon will improve shoulder external rotation MMT to >= 5/5 to promote improved postural control and overhead reach. 4.  [] Date:Progressing   5. Ainsley Harmon will demonstrate ability to lift 100 lbs from the floor in order to complete household chores. 5.  [] Date:Progressing   6. Ainsley Harmon will demonstrate a  >=14 lbs improvement in  strength to complete carrying, lifting, and self care activities. 6.  [] Date:Progressing       Rehabilitation Potential For Stated Goals: GOOD    Outcome Measure:    Tool Used: Neck Disability Index (NDI)  Score:  Initial: 20/50  Most Recent: 6/50 (Date: 10/7/20 ) Interpretation of Score: The Neck Disability Index is a revised form of the Oswestry Low Back Pain Index and is designed to measure the activities of daily living in person's with neck pain. Each section is scored on a 0-5 scale, 5 representing the greatest disability. The scores of each section are added together for a total score of 50. Tool Used:  Strength  Score:  Initial: 112 lbs left, right 123 lb Most Recent: left 124 lbs, right 120 (Date: 10/7/20 )   Interpretation of Score: MDC for  strength is 14 lbs      Medical Necessity:   · Skilled intervention continues to be required due to address above deficits affecting participation in basic ADLs and overall functional tolerance. Reason for Services/Other Comments:  · Patient continues to require skilled intervention due to address above deficits affecting participation in basic ADLs and overall functional tolerance. Total Treatment Duration: 45 min plus treatment  PT Patient Time In/Time Out  Time In: 1600  Time Out: 1700    Regarding Rosi Liang's therapy, I certify that the treatment plan above will be carried out by a therapist or under their direction. Thank you for this referral,  Fuad Casper, PT     Referring Physician Signature: DELL Ribera  Signature__________________________________________________ Date__________________________. HISTORY:   History of Injury/Illness (Reason for Referral):  Pt had imaging done and found to have cervical stenosis. Headaches occur 5 days per week that radiate up to base of skull and with occasional radiating pain to top head. pt reports he has continued to do  Exercises from previous bouts of therapy.   · Aggravating factors: turning head, repetitive motions   · Relieving factors: massage, stretching, exercises    Dominant Side:         RIGHT    Pain Scale on day of evaluation:  · Current: 7-8/10  · Best: 6/10  · Worst: 8/10    Prior Level of Function/Work/Activity:  Current level of function: headaches 5 days a week, pain relief with stretching,    Work/hobbies: , requires heavy lifting, works out on a regular basis and walks dogs. Other Clinical Tests:          Imaging: YES MRI of c-spine    Previous Treatment Approaches:          Outpatient therapy  Social History/Living Environment:   Pt lives in a one level home alone. Past Medical History/Comorbidities:   Mr. Bobby Vega  has a past medical history of Anxiety, Broken nose, Closed displaced fracture of proximal phalanx of left little finger, Fracture of phalanx of right index finger, and H/O renal calculi (2002). Mr. Bobby Vega  has a past surgical history that includes hx lithotripsy (2002). Ambulatory/Rehab Services H2 Model Falls Risk Assessment    Risk Factors:       (1)  Gender [Male] Ability to Rise from Chair:       (0)  Ability to rise in a single movement    Falls Prevention Plan:       No modifications necessary   Total: (5 or greater = High Risk): 1    ©2010 [facility] [employer]. All Rights Reserved. [facility] Patent #[id number]. Federal Law prohibits the replication, distribution or use without written permission from [facility] [employer]     Current Medications:       Current Outpatient Medications:     methylPREDNISolone (MEDROL DOSEPACK) 4 mg tablet, Take as directed, Disp: 1 Dose Pack, Rfl: 0    diclofenac EC (VOLTAREN) 75 mg EC tablet, Take 1 Tab by mouth two (2) times a day., Disp: 60 Tab, Rfl: 0    baclofen (LIORESAL) 20 mg tablet, Take 1 Tab by mouth nightly., Disp: 30 Tab, Rfl: 0    pravastatin (PRAVACHOL) 20 mg tablet, Take 1 Tab by mouth nightly., Disp: 90 Tab, Rfl: 1    ezetimibe (ZETIA) 10 mg tablet, Take 1 Tab by mouth daily. , Disp: 90 Tab, Rfl: 1    sertraline (ZOLOFT) 50 mg tablet, Take 1 Tab by mouth daily. , Disp: 90 Tab, Rfl: 1   Date Last Reviewed:  10/7/2020        0: LOW COMPLEXITY   EXAMINATION:   Observation/Orthostatic Postural Assessment:          Rounded shoulders forward head  Palpation:          Tenderness in cervical paraspinal, levator scapulae, upper trap, middle trap, and rhomboids pt is guarded.   ROM:      AROM/PROM                  Joint: Eval Date: 8/12/20  Re-Assess Date:  Re-Assess Date:    Active ROM         Cervical Extension 42  57 dg      Cervical Flexion 52         RIGHT LEFT RIGHT LEFT RIGHT LEFT   Cervical Sidebending 32 42       Cervical Rotation 53 43 58 58              Shoulder Flexion No restriction No restriction       Shoulder Abduction No restriction No restriction       Shoulder External Rotation No restriction No restriction       Thoracic Flexion No restriction No restriction       Thoracic Extension neutral neutral             Strength:     Eval Date: 8/12/20  Re-Assess Date: 10/7/20  Re-Assess Date:      RIGHT LEFT RIGHT LEFT RIGHT LEFT   Shoulder Flexion 5/5 5/5       Shoulder Abduction (C5) 4+/5 4+/5       Shoulder Internal Rotation 5/5 5/5       Shoulder External Rotation 4/5 4/5 4+/5 4+/5     Elbow Flexion (C6) 5/5 5/5       Elbow Extension (C7) 5/5 5/5       Wrist Flexion (C7) 5/5 5/5       Wrist Extension (C6) 5/5 5/5       Resisted Thumb Extension/Finger Abduction (C8/T1) Normal     Normal        Strength 123 lb 112 lb                Strength:  0-5 scale, 0 no muscle contraction; 1 no joint motion but contraction felt; 2 -less than full ROM in gravity eliminated; 2 full ROM in grav eliminated; 2+ full ROM in grav eliminated and lance to withstand minimal resist; 3-less than full ROM against gravity; 3 full ROM against gravity;  3+ full ROM against gravity and able to withstand minimal resist; 4- full ROM against gravity and able to withstand less than mod resist; 4 full ROM against gravity and able to withstand mod resist; 5 full ROM against gravity and able to withstand max resist.     Special Tests:              Spurling's Test: no radicular symptoms created, pt reports increased tension             Sharp Pursar (instability of the atlanto-axial joint): NT             ULNTT: /Negative    *Denies dysarthria, diplopia, drop attacks, dizziness, dysphagia      Neurological Screen: Radiating symptoms? None at this time     Body Structures Involved:  1. Nerves  2. Joints  3. Muscles  4. Ligaments Body Functions Affected:  1. Sensory/Pain  2. Neuromusculoskeletal  3. Movement Related Activities and Participation Affected:  1. Mobility  2.  Self Care   Number of elements that affect the Plan of Care: LOW COMPLEXITY   CLINICAL PRESENTATION:   Presentation: Stable and uncomplicated: LOW COMPLEXITY   CLINICAL DECISION MAKING:      Use of outcome tool(s) and clinical judgement create a POC that gives a: Clear prediction of patient's progress: LOW COMPLEXITY

## 2020-10-07 NOTE — PROGRESS NOTES
Cornel Peoples  : 1980  Payor: Deirdre Saleh / Plan: [facility] / Product Type: PPO /  [address] at [place] [address], [facility], [address] [place] [address] Road  [phone]   [phone]                           OUTPATIENT PHYSICAL THERAPY: Daily Treatment Note 10/7/2020 Visit Count:  10    Treatment Diagnosis: Cervicalgia (M54.2)  Headache (R51)  Muscle Weakness (generalized) (M62.81)  Pain in thoracic spine (M54.6)  Precautions/Allergies:   Patient has no known allergies. MD Orders: Eval and Treat MEDICAL/REFERRING DIAGNOSIS:  Cervical disc disorder with radiculopathy, unspecified cervical region [M50.10]  Other cervical disc degeneration, unspecified cervical region [M50.30]  Radiculopathy, cervical region [M54.12]   DATE OF ONSET: Chronic  REFERRING PHYSICIAN: Lori Thomas PA  RETURN PHYSICIAN APPOINTMENT:  TBD by patient     Pre-treatment Symptoms/Complaints:  \"I am feeling a lot better today, and have been recently\"  Pain: Initial:does not rate/10 Post Session: does not rate/10   Medications Last Reviewed:  10/7/2020     Updated Objective Findings: See Progress report for further details. TREATMENT:   THERAPEUTIC EXERCISE: ( 25 minutes):  Exercises per grid below to improve mobility, strength and balance. Required minimal visual, verbal and manual cues to promote proper body alignment and promote proper body posture. Progressed resistance and complexity of movement as indicated.      Date:  20 Date:  20 Date:  20 Date:  20 Date:  20 Date  10/5/20 Date:  10/7/20   Activity/Exercise          Education       Assessment of MMT, NDI,  strength, and ROM to assess functional changes from therapy intervention   Chair Thoracic extension Mob  Foam Roller with OH reach  5 min  Chair mobilization  3 min Foam Roller with OH reach  5 min  Prone with towel 20 sec  Foam Roller with OH reach  5  EOB with OH reach  5 2x ; and foam roller stretch 3 mins    UBE 3F/3B 3F/3B 3F/3B 3F/3B  lvl 2.5 3F/3B  lvl 2.5 3F/3B L4 3F/3B L4   shrugs 10 lb  3 x 10 reps         Isometric Cervical retraction OH reach with dowel  5 x 20 sec  1 x 30 sec  OH reach with dowel, supine  4 x 20 sec       Long Sitting 90/90 ER Standing  Blue band  3 x 10 reps         ER with OH reach Purple band  3 x 5 reps         planks 3 x 45 sec         Isometric Cervical rotation Green band  3 x 30 sec         Rows  27 lb  2 x 15 reps  33 lb  2 x 10 reps      Self cervical Traction on chair  Red band  5 min        Ys with cervical retraction   Prone  3 x 30 sec Prone  2lb  2 x 8 reps      Wall [place]   2 x 10 reps  Yoga block behind head 2 x 10 reps, 2lb  Yoga block behind head      Ts cervical retraction    Prone  2lb  2 x 8 reps      Pull Down    30 lbs  2 x 10 reps 40 lbs  3 x 10 reps 40 lbs  4 x 10 reps    Body weighted rows     3 x 10 reps  3 x 10 reps       THERAPEUTIC ACTIVITY: ( 20 minutes): Activities per gid below to improve functional movement related mobility, strength and balance to improve neuro-muscular carryover to daily functional activities for improving patient's quality of life. Required visual, verbal and manual cues to promote proper body alignment and promote proper body posture/mechanics. Progressed resistance and complexity of movement as indicated.      Date:  9/1420 Date:  9/16/20 Date:  9/28/20 Date  10/5/20 Date:  10/7/20   Activity/Exercise Parameters       Deadlift  65lbs  3 x 5 reps 75 lb  3 x 5 reps 75   3x5 reps 80 lbs  3 x 5 reps   Attentive.ly Get up [employer]  Left/Right  10lb  2 x 8 reps   L/R   10lb  2x8 reps L/R   15lb  2x8 reps   Quadruped Thoracic rotation with flexion and extension Left/right  10 x each       OH Press         20 lbs  3 x 5 reps   Hickman Carry         25 lbs  3 x 150ft                       MANUAL THERAPY: (0 minutes): Joint mobilization, Soft tissue mobilization was utilized and necessary because of the patient's restricted joint motion and restricted motion of soft tissue mobility. Date  9/14/2020    Technique Used Grade  Level # Time(s) Effect while being performed   CT Junction Manioulation V CT junction 0 min Improved mobility and relieved symptoms of pain   Trigger point Dry needling and palpation  Left upper trap 15 min Relief of muscle spasm, pain, and improve tissue extensibility                                        Dry Needles Used: 3   Dry Needles Removed: 3         MODALITIES: (15 minutes):      mechanical cervical traction   Performed today:  Intermittent 1 min hold @ 22lb and 30 sec rest @8lb  x 15 mins- Therapist monitor patient for neurological changes or adverse reactions. Cervical spine at 20 dg of flexion. Performed previously:  Static hold at 22 lbs, with 3 steps up and down for total of 15 min in order to relieve pain and muscle spasms. Therapist monitor patient for neurological changes or adverse reactions. Cervical spine at 20 dg of flexion. HEP Log Date 1.    10/7/2020   2.  10/7/2020   3. 10/7/2020   4.    5.           [employer] Portal  Treatment/Session Summary:    Response to Treatment: Pt progressing toward goals and shows improvements in overall mobility and strength. See progress report for further details. Communication/Consultation:  None today   Equipment provided today: None today     Recommendations/Intent for next treatment session:   Next visit will focus on Manual Therapy Pain Science Education Dry Needling Traction RTC strengthening soft tissue mobilization thoracic mobility and strengthening, over head press and squat, Jaime Crozier carry, and  carry next visit, progress to full Fijian get up next visit. Effective Dates: 10/7/20 TO 11/13/20 (30 days).   Frequency/Duration: 2 times a week for 60 Days    Total Treatment Billable Duration:   60 mins total  PT Patient Time In/Time Out  Time In: 1600  Time Out: 1700  Rosita Morales PT    Future Appointments   Date Time Provider Oneyda Peter   10/12/2020  4:00 PM Teddy Sanders, [facility] [place] [facility]   10/14/2020  4:00 PM Teddy Sanders PT [facility]   11/30/2020  7:30 AM Breanne Paez MD SSA [facility]VP FVP

## 2020-10-12 ENCOUNTER — HOSPITAL ENCOUNTER (OUTPATIENT)
Dept: PHYSICAL THERAPY | Age: 40
Discharge: HOME | End: 2020-10-12
Payer: COMMERCIAL

## 2020-10-12 PROCEDURE — 97530 THERAPEUTIC ACTIVITIES: CPT

## 2020-10-12 PROCEDURE — 97110 THERAPEUTIC EXERCISES: CPT

## 2020-10-12 PROCEDURE — 97012 MECHANICAL TRACTION THERAPY: CPT

## 2020-10-12 NOTE — PROGRESS NOTES
Tala   : 1980  Payor: Mary Regan / Plan: [facility] / Product Type: PPO /  Holly Price at [address] [facility] [address], [facility], [address] [place] [address]  [phone]   [phone]                           OUTPATIENT PHYSICAL THERAPY: Daily Treatment Note 10/12/2020 Visit Count:  11    Treatment Diagnosis: Cervicalgia (M54.2)  Headache (R51)  Muscle Weakness (generalized) (M62.81)  Pain in thoracic spine (M54.6)  Precautions/Allergies:   Patient has no known allergies. MD Orders: Eval and Treat MEDICAL/REFERRING DIAGNOSIS:  Cervical disc disorder with radiculopathy, unspecified cervical region [M50.10]  Other cervical disc degeneration, unspecified cervical region [M50.30]  Radiculopathy, cervical region [M54.12]   DATE OF ONSET: Chronic  REFERRING PHYSICIAN: Lori Thomas PA  RETURN PHYSICIAN APPOINTMENT:  TBD by patient     Pre-treatment Symptoms/Complaints:  \"I had a few headaches this weekend from the rain. I'm feeling tired today and had a headache this morning\"  Pain: Initial:does not rate/10 Post Session: does not rate/10   Medications Last Reviewed:  10/12/2020     Updated Objective Findings: See Progress report for further details. TREATMENT:   THERAPEUTIC EXERCISE: ( 20 minutes):  Exercises per grid below to improve mobility, strength and balance. Required minimal visual, verbal and manual cues to promote proper body alignment and promote proper body posture. Progressed resistance and complexity of movement as indicated.      Date:  20 Date  10/5/20 Date:  10/7/20 Date:  10/12/20   Activity/Exercise       Education   Assessment of MMT, NDI,  strength, and ROM to assess functional changes from therapy intervention    Chair Thoracic extension Mob Foam Roller with OH reach  5  EOB with OH reach  5 2x ; and foam roller stretch 3 mins     UBE 3F/3B  lvl 2.5 3F/3B L4 3F/3B L4 3F/3B L4   Pull Down 40 lbs  3 x 10 reps 40 lbs  4 x 10 reps     Body weighted rows 3 x 10 reps  3 x 10 reps 3 x 8 reps   Freemotion machine latpulls    2 x 8 reps   Freemotion machine triceps extension    2 x 8 reps       THERAPEUTIC ACTIVITY: ( 25 minutes): Activities per gid below to improve functional movement related mobility, strength and balance to improve neuro-muscular carryover to daily functional activities for improving patient's quality of life. Required visual, verbal and manual cues to promote proper body alignment and promote proper body posture/mechanics. Progressed resistance and complexity of movement as indicated. Date:  9/28/20 Date  10/5/20 Date:  10/7/20 Date:  10/12/20   Activity/Exercise       Deadlift 75 lb  3 x 5 reps 75   3x5 reps 80 lbs  3 x 5 reps 90 lbs  3 x 5 reps   Finnish Get up Left/Right  10lb  2 x 8 reps   L/R   10lb  2x8 reps L/R   15lb  2x8 reps L/R   15lb  2x8 reps   OH Press     20 lbs  3 x 5 reps 25lbs  3 x 5 reps   Hickman Carry       20 lbs  2 x 150ft                     MANUAL THERAPY: (0 minutes): Joint mobilization, Soft tissue mobilization was utilized and necessary because of the patient's restricted joint motion and restricted motion of soft tissue mobility. Date  9/14/2020    Technique Used Grade  Level # Time(s) Effect while being performed   CT Junction Manioulation V CT junction 0 min Improved mobility and relieved symptoms of pain   Trigger point Dry needling and palpation  Left upper trap 15 min Relief of muscle spasm, pain, and improve tissue extensibility                                        Dry Needles Used: 3   Dry Needles Removed: 3         MODALITIES: (15 minutes):      mechanical cervical traction   Performed today:  Intermittent 1 min hold @ 22lb and 30 sec rest @8lb  x 15 mins- Therapist monitor patient for neurological changes or adverse reactions. Cervical spine at 20 dg of flexion.    Performed previously:  Static hold at 22 lbs, with 3 steps up and down for total of 15 min in order to relieve pain and muscle spasms. Therapist monitor patient for neurological changes or adverse reactions. Cervical spine at 20 dg of flexion. HEP Log Date 1.    10/12/2020   2.  10/12/2020   3. 10/12/2020   4.    5.           [employer] Portal  Treatment/Session Summary:    Response to Treatment: Pt was low on energy but completed all exercises. Pt is showing an increase in strength when doing dead lift and over head press. Pt required no cues on form when performing overhead press. Pt reported no pain today. Communication/Consultation:  None today   Equipment provided today: None today     Recommendations/Intent for next treatment session:   Next visit will focus on Manual Therapy Pain Science Education Dry Needling Traction RTC strengthening soft tissue mobilization thoracic mobility and strengthening, Include  carry next visit, progress to full Marshallese get up next visit. Treatment Plan of Care Effective Dates: 10/7/2020 TO 11/13/2020 (30 days).   Frequency/Duration: 2 times a week for 30 Days       Total Treatment Billable Duration:   60 mins total  PT Patient Time In/Time Out  Time In: 1545  Time Out: 4831 [facility],     Future Appointments   Date Time Provider Oneyda Peter   10/12/2020  4:00 PM Beatrice Daniel PT [facility]   10/14/2020  4:00 PM Beatrice Daniel PT [facility]   11/30/2020  7:30 AM MD MEGGAN Calvillo FVP FVP

## 2020-10-14 ENCOUNTER — HOSPITAL ENCOUNTER (OUTPATIENT)
Dept: PHYSICAL THERAPY | Age: 40
Discharge: HOME | End: 2020-10-14
Payer: COMMERCIAL

## 2020-10-14 PROCEDURE — 97110 THERAPEUTIC EXERCISES: CPT

## 2020-10-14 PROCEDURE — 97530 THERAPEUTIC ACTIVITIES: CPT

## 2020-10-14 PROCEDURE — 97012 MECHANICAL TRACTION THERAPY: CPT

## 2020-10-14 NOTE — PROGRESS NOTES
Leobardo Exon  : 1980  Payor: Christian Junior / Plan: [facility] / Product Type: PPO /  Keira Deist at [address], [place], [address] [place] [address] Road  [phone]   [phone]                           OUTPATIENT PHYSICAL THERAPY: Daily Treatment Note 10/14/2020 Visit Count:  12    Treatment Diagnosis: Cervicalgia (M54.2)  Headache (R51)  Muscle Weakness (generalized) (M62.81)  Pain in thoracic spine (M54.6)  Precautions/Allergies:   Patient has no known allergies. MD Orders: Eval and Treat MEDICAL/REFERRING DIAGNOSIS:  Cervical disc disorder with radiculopathy, unspecified cervical region [M50.10]  Other cervical disc degeneration, unspecified cervical region [M50.30]  Radiculopathy, cervical region [M54.12]   DATE OF ONSET: Chronic  REFERRING PHYSICIAN: Lori Thomas PA  RETURN PHYSICIAN APPOINTMENT:  TBD by patient     Pre-treatment Symptoms/Complaints:  \"I am feeling good. I'm ready to get better fully and be able to do more lifting at home\"  Pain: Initial:does not rate/10 Post Session: does not rate/10   Medications Last Reviewed:  10/14/2020     Updated Objective Findings: None today        TREATMENT:   THERAPEUTIC EXERCISE: ( 25 minutes):  Exercises per grid below to improve mobility, strength and balance. Required minimal visual, verbal and manual cues to promote proper body alignment and promote proper body posture. Progressed resistance and complexity of movement as indicated.      Date:  20 Date  10/5/20 Date:  10/7/20 Date:  10/12/20 Date:  10/14/20   Activity/Exercise        Education   Assessment of MMT, NDI,  strength, and ROM to assess functional changes from therapy intervention     Chair Thoracic extension Mob Foam Roller with OH reach  5  EOB with OH reach  5 2x ; and foam roller stretch 3 mins      UBE 3F/3B  lvl 2.5 3F/3B L4 3F/3B L4 3F/3B L4 3F/3B L4   Pull Down 40 lbs  3 x 10 reps 40 lbs  4 x 10 reps Body weighted rows 3 x 10 reps  3 x 10 reps 3 x 8 reps    Freemotion machine latpulls    2 x 8 reps 37 lbs   3 x 8 reps   Freemotion machine triceps extension    2 x 8 reps 37 lbs  3 x 8 reps   Freemotion machine rows     40 lbs   3 x 8 reps       THERAPEUTIC ACTIVITY: ( 20 minutes): Activities per gid below to improve functional movement related mobility, strength and balance to improve neuro-muscular carryover to daily functional activities for improving patient's quality of life. Required visual, verbal and manual cues to promote proper body alignment and promote proper body posture/mechanics. Progressed resistance and complexity of movement as indicated. Date:  9/28/20 Date  10/5/20 Date:  10/7/20 Date:  10/12/20 Date:  10/14/20   Activity/Exercise        Deadlift 75 lb  3 x 5 reps 75   3x5 reps 80 lbs  3 x 5 reps 90 lbs  3 x 5 reps 95 lbs  3 x 5   Greenlandic Get up Left/Right  10lb  2 x 8 reps   L/R   10lb  2x8 reps L/R   15lb  2x8 reps L/R   15lb  2x8 reps Full  Left/Right  15 lbs   2 x 5 reps   OH Press     20 lbs  3 x 5 reps 25lbs  3 x 5 reps 30lbs  3 x 8   Hickman Carry       20 lbs  2 x 150ft 25 lbs  2 x 150 ft                       MANUAL THERAPY: (0 minutes): Joint mobilization, Soft tissue mobilization was utilized and necessary because of the patient's restricted joint motion and restricted motion of soft tissue mobility.         Date  9/14/2020    Technique Used Grade  Level # Time(s) Effect while being performed   CT Junction Manioulation V CT junction 0 min Improved mobility and relieved symptoms of pain   Trigger point Dry needling and palpation  Left upper trap 15 min Relief of muscle spasm, pain, and improve tissue extensibility                                        Dry Needles Used: 3   Dry Needles Removed: 3         MODALITIES: (15 minutes):      mechanical cervical traction   Performed today:  Static hold at 22 lbs, with 3 steps up and down for total of 15 min in order to relieve pain and muscle spasms. Therapist monitor patient for neurological changes or adverse reactions. Cervical spine at 20 dg of flexion. Performed previously:  Intermittent 1 min hold @ 22lb and 30 sec rest @8lb  x 15 mins- Therapist monitor patient for neurological changes or adverse reactions. Cervical spine at 20 dg of flexion. HEP Log Date 1.    10/14/2020   2.  10/14/2020   3. 10/14/2020   4.    5.           [employer] Portal  Treatment/Session Summary:    Response to Treatment: Pt is showing an increase in strength when doing dead lift by increasing 5 lbs. Pt performed dead lift with no cues for proper form. Pt completed full Lao get ups without cueing. Pt showed tremendous enthusiasm and was very positive when doing exercise. Pt reported no headaches today. Communication/Consultation:  None today   Equipment provided today: None today     Recommendations/Intent for next treatment session:   Next visit will focus on Manual Therapy Pain Science Education Dry Needling Traction RTC strengthening soft tissue mobilization thoracic mobility and strengthening, Include squat next session. Treatment Plan of Care Effective Dates: 10/7/2020 TO 11/13/2020 (30 days).   Frequency/Duration: 2 times a week for 30 Days       Total Treatment Billable Duration:   65 mins total  PT Patient Time In/Time Out  Time In: 8799  Time Out: [place], PT    Future Appointments   Date Time Provider Oneyda Peter   11/30/2020  7:30 AM MD MEGGAN [place] FVP F[facility]

## 2020-11-04 NOTE — THERAPY DISCHARGE
Taran Zavala  : 1980  Primary: [facility]  Secondary:  [facility] [place] [id number]  [phone]   [phone]      OUTPATIENT PHYSICAL THERAPY: Discontinuation Assessment 2020    Treatment Diagnosis: Cervicalgia (M54.2)  Headache (R51)  Muscle Weakness (generalized) (M62.81)  Pain in thoracic spine (M54.6)  Precautions/Allergies:   Patient has no known allergies. MD Orders: Eval and Treat MEDICAL/REFERRING DIAGNOSIS:  Cervical disc disorder with radiculopathy, unspecified cervical region [M50.10]  Other cervical disc degeneration, unspecified cervical region [M50.30]  Radiculopathy, cervical region [M54.12]   DATE OF ONSET: Chronic  REFERRING PHYSICIAN: Lori Thomas PA  RETURN PHYSICIAN APPOINTMENT:  TBD by patient   Dicontinuation ASSESSMENT:  Mr. Taran Zavala has not returned to therapy since 10/14/20 and will be considered a self d/c. Pt was able to complete HEP and had access to his personal gym in order to maintain functional gains. Therapist was unable to assess remainder of LTGs due to self d/c however pt was anticipated to meet all goals. PT POC closed. TREATMENT PLAN:  Effective Dates: 10/7/20 TO 20 (30 days). Frequency/Duration: 2 times a week for 60 Days    GOALS: (Goals have been discussed and agreed upon with patient.)   Short-Term Goals: 30 days  Goal Met   1. Taran Zavala will improve rotation ROM by  >=10 degree to assist with improved function and less pain with driving. 1.  [x] Date: 10/7/20   2. Taran Zavala will improve extension ROM by  >= 5 degree to assist with improved function and less pain with looking up. 2.  [x] Date: 10/7/20   3. Taran Zavala will be able to carry 50 lbs over >=150ft to carry objects for ADLs and IADLs. 3.  [x] Date: 10/7/20         Long Term Goals: 60 days Goal Met   1.  Taran Zavala  will be independent with HEP for cervical region and UE's to sustain functional gains and pain management techniques made in therapy. 1.  [] Date:Progressing   2. Sabine Sweeney will demonstrate improved NDI score by greater than or equal to 7 points indicating improved cervical mobility and strengthen for improved participation in ADLs and IADLS. 2.  [x] Date: 10/7/20   3. Sabine Sweeney will demonstrate lifting overhead  with 30 lbs in order to place items in overhead cabinet. 3.  [] Date: Progressing   4. Sabine Sweeney will improve shoulder external rotation MMT to >= 5/5 to promote improved postural control and overhead reach. 4.  [] Date:Progressing   5. Sabine Sweeney will demonstrate ability to lift 100 lbs from the floor in order to complete household chores. 5.  [] Date:Progressing   6. Sabine Sweeney will demonstrate a  >=14 lbs improvement in  strength to complete carrying, lifting, and self care activities. 6.  [] Date:Progressing       Rehabilitation Potential For Stated Goals: GOOD    Outcome Measure: Tool Used: Neck Disability Index (NDI)  Score:  Initial: 20/50  Most Recent: 6/50 (Date: 10/7/20 )   Interpretation of Score: The Neck Disability Index is a revised form of the Oswestry Low Back Pain Index and is designed to measure the activities of daily living in person's with neck pain. Each section is scored on a 0-5 scale, 5 representing the greatest disability. The scores of each section are added together for a total score of 50. Tool Used:  Strength  Score:  Initial: 112 lbs left, right 123 lb Most Recent: left 124 lbs, right 120 (Date: 10/7/20 )   Interpretation of Score: MDC for  strength is 14 lbs    Total Treatment Duration: 0 min     Regarding Regla Liang's therapy, I certify that the treatment plan above will be carried out by a therapist or under their direction.   Thank you for this referral,  Komal Kebede PT     Referring Physician Signature: DELL Levy  No signature required EXAMINATION:       AROM/PROM                  Joint: Eval Date: 8/12/20  Re-Assess Date:  Re-Assess Date:    Active ROM         Cervical Extension 42  57 dg      Cervical Flexion 52         RIGHT LEFT RIGHT LEFT RIGHT LEFT   Cervical Sidebending 32 42       Cervical Rotation 53 43 58 58              Shoulder Flexion No restriction No restriction       Shoulder Abduction No restriction No restriction       Shoulder External Rotation No restriction No restriction       Thoracic Flexion No restriction No restriction       Thoracic Extension neutral neutral             Strength:     Eval Date: 8/12/20  Re-Assess Date: 10/7/20  Re-Assess Date:      RIGHT LEFT RIGHT LEFT RIGHT LEFT   Shoulder Flexion 5/5 5/5       Shoulder Abduction (C5) 4+/5 4+/5       Shoulder Internal Rotation 5/5 5/5       Shoulder External Rotation 4/5 4/5 4+/5 4+/5     Elbow Flexion (C6) 5/5 5/5       Elbow Extension (C7) 5/5 5/5       Wrist Flexion (C7) 5/5 5/5       Wrist Extension (C6) 5/5 5/5       Resisted Thumb Extension/Finger Abduction (C8/T1) Normal     Normal        Strength 123 lb 112 lb                Strength:  0-5 scale, 0 no muscle contraction; 1 no joint motion but contraction felt; 2 -less than full ROM in gravity eliminated; 2 full ROM in grav eliminated; 2+ full ROM in grav eliminated and lance to withstand minimal resist; 3-less than full ROM against gravity; 3 full ROM against gravity;  3+ full ROM against gravity and able to withstand minimal resist; 4- full ROM against gravity and able to withstand less than mod resist; 4 full ROM against gravity and able to withstand mod resist; 5 full ROM against gravity and able to withstand max resist.

## 2021-11-24 PROBLEM — F51.04 PSYCHOPHYSIOLOGICAL INSOMNIA: Status: ACTIVE | Noted: 2021-11-24
